# Patient Record
Sex: FEMALE | Race: WHITE | Employment: UNEMPLOYED | URBAN - NONMETROPOLITAN AREA
[De-identification: names, ages, dates, MRNs, and addresses within clinical notes are randomized per-mention and may not be internally consistent; named-entity substitution may affect disease eponyms.]

---

## 2019-10-10 ENCOUNTER — HOSPITAL ENCOUNTER (EMERGENCY)
Age: 64
Discharge: HOME | End: 2019-10-10
Attending: EMERGENCY MEDICINE
Payer: MEDICARE

## 2019-10-10 VITALS
WEIGHT: 120 LBS | BODY MASS INDEX: 20.6 KG/M2 | SYSTOLIC BLOOD PRESSURE: 150 MMHG | RESPIRATION RATE: 16 BRPM | DIASTOLIC BLOOD PRESSURE: 84 MMHG | HEART RATE: 76 BPM | OXYGEN SATURATION: 97 % | TEMPERATURE: 98.7 F

## 2019-10-10 DIAGNOSIS — L91.8 SKIN TAG: Primary | ICD-10-CM

## 2019-10-10 DIAGNOSIS — T14.8XXA BLEEDING FROM WOUND: ICD-10-CM

## 2019-10-10 PROCEDURE — 99283 EMERGENCY DEPT VISIT LOW MDM: CPT

## 2019-10-10 PROCEDURE — 6370000000 HC RX 637 (ALT 250 FOR IP): Performed by: EMERGENCY MEDICINE

## 2019-10-10 PROCEDURE — 2500000003 HC RX 250 WO HCPCS: Performed by: EMERGENCY MEDICINE

## 2019-10-10 RX ORDER — LIDOCAINE HYDROCHLORIDE AND EPINEPHRINE 10; 10 MG/ML; UG/ML
20 INJECTION, SOLUTION INFILTRATION; PERINEURAL ONCE
Status: COMPLETED | OUTPATIENT
Start: 2019-10-10 | End: 2019-10-10

## 2019-10-10 RX ADMIN — MICROFIBRILLAR COLLAGEN HEMOSTAT POWDER: POWDER at 23:37

## 2019-10-10 RX ADMIN — LIDOCAINE HYDROCHLORIDE,EPINEPHRINE BITARTRATE 20 ML: 10; .01 INJECTION, SOLUTION INFILTRATION; PERINEURAL at 23:36

## 2019-10-10 ASSESSMENT — ENCOUNTER SYMPTOMS
EYES NEGATIVE: 1
GASTROINTESTINAL NEGATIVE: 1
RESPIRATORY NEGATIVE: 1

## 2019-10-10 ASSESSMENT — PAIN SCALES - GENERAL: PAINLEVEL_OUTOF10: 2

## 2020-09-25 ENCOUNTER — HOSPITAL ENCOUNTER (OUTPATIENT)
Dept: PHYSICAL THERAPY | Age: 65
Setting detail: THERAPIES SERIES
Discharge: HOME | End: 2020-09-25
Payer: MEDICARE

## 2020-09-25 PROCEDURE — 97110 THERAPEUTIC EXERCISES: CPT

## 2020-09-25 PROCEDURE — G0283 ELEC STIM OTHER THAN WOUND: HCPCS

## 2020-09-25 PROCEDURE — 97161 PT EVAL LOW COMPLEX 20 MIN: CPT

## 2020-09-25 ASSESSMENT — PAIN DESCRIPTION - ONSET: ONSET: SUDDEN

## 2020-09-25 ASSESSMENT — PAIN DESCRIPTION - PAIN TYPE: TYPE: ACUTE PAIN

## 2020-09-25 ASSESSMENT — PAIN DESCRIPTION - FREQUENCY: FREQUENCY: INTERMITTENT

## 2020-09-25 ASSESSMENT — PAIN SCALES - GENERAL: PAINLEVEL_OUTOF10: 0

## 2020-09-25 ASSESSMENT — PAIN - FUNCTIONAL ASSESSMENT: PAIN_FUNCTIONAL_ASSESSMENT: ACTIVITIES ARE NOT PREVENTED

## 2020-09-25 ASSESSMENT — PAIN DESCRIPTION - ORIENTATION: ORIENTATION: RIGHT

## 2020-09-25 ASSESSMENT — PAIN DESCRIPTION - PROGRESSION: CLINICAL_PROGRESSION: NOT CHANGED

## 2020-09-25 ASSESSMENT — PAIN DESCRIPTION - LOCATION: LOCATION: SHOULDER

## 2020-09-25 ASSESSMENT — PAIN DESCRIPTION - DESCRIPTORS: DESCRIPTORS: ACHING;DULL

## 2020-09-25 NOTE — PROGRESS NOTES
Physical Therapy  Initial Assessment  Date: 2020  Patient Name: Arjun Nelson  MRN: [id number]  : 1955     Treatment Diagnosis: R UE pain    Restrictions  Position Activity Restriction  Other position/activity restrictions: none    Subjective   General  Family / Caregiver Present: No  Referring Practitioner: Calvin Ahumada  Diagnosis: R shoulder tendinitis  Follows Commands: Within Functional Limits  PT Visit Information  PT Insurance Information: Medicare advantage  Subjective  Subjective:  onset R upper back/ shoulder pain due to painting- avoids lying on shoulder  Pain Assessment  Pain Assessment: 0-10  Pain Level: 0(max pain 8/10)  Pain Type: Acute pain  Pain Location: Shoulder  Pain Orientation: Right  Pain Descriptors: Aching;Dull  Pain Frequency: Intermittent  Pain Onset: Sudden  Clinical Progression: Not changed  Functional Pain Assessment: Activities are not prevented    Vision/Hearing  Vision  Vision: (glasses)  Hearing  Hearing: Within functional limits    Orientation       Social/Functional History  Social/Functional History  Lives With: Alone  ADL Assistance: Independent  Homemaking Assistance: Independent  Ambulation Assistance: Independent  Transfer Assistance: Independent  Occupation: Retired  Leisure & Hobbies: bowling  IADL Comments: continues with all IADL despite pain    Objective     Observation/Palpation  Posture: Good  Palpation: R UT TTP    AROM RUE (degrees)  RUE AROM : WFL  Spine  Cervical: cervical AROM WFL - painfree in all directions    Strength RUE  Strength RUE: Exception  Comment: MMT produced R UE paresthesia  R Shoulder Flexion: At least;3+/5  R Shoulder ABduction: At least;3+/5  R Shoulder External Rotation:  At least;4/5     Additional Measures  Special Tests: R shoulder impingement poduced R UE parethesia                                             Assessment   Conditions Requiring Skilled Therapeutic Intervention  Body structures, Functions, Activity

## 2020-09-25 NOTE — PLAN OF CARE
Outpatient Physical Therapy           [place]           [x] Phone: [phone]   Fax: [phone]  [facility]           [] Phone: [phone]   Fax: [phone]     To: Referring Practitioner: Sedrick Hickman    From: Miguel Jeronimo, PT     Patient: Kaela Brasher       : 1955  Diagnosis: Diagnosis: R shoulder tendinitis   Treatment Diagnosis: Treatment Diagnosis: R UE pain /R upper back pain  Date: 2020    Physical Therapy Certification/Re-Certification Form    The following patient has been evaluated for physical therapy services and for therapy to continue, insurance requires physician review of the treatment plan initially and every 90 days. Please review the attached evaluation and/or summary of the patient's plan of care, and verify that you agree therapy should continue by signing the attached document and sending it back to our office.          ASSESSMENT  Patient primary complaints:R upper back/ R UE pain   History of condition:late 2020 onset R upper back/ shoulder pain due to painting- avoids lying on shoulder  Current functional limitations: does not limit function however R UE function can be painful  Clinical findings:Quick DASH 27/55; neck- R shoulder ROM WFL  PLOF:semi retired lives alone- indpendant with all necessary ADL/IADL  Skilled PT interventions are intended to: decrease pain and establish HEP which will enable patient  to return to PLOF  Patient agrees with established plan of care and assisted in the development of their  goals  Barriers to learning:none- no mental/cognitive barriers observed  Preferred learning style(s):   written- demonstration -practice  Preferred Language: English  Potential barriers to progress:none  The patient appears motivated to participate in PT and regain PLOF: yes  Plan of Care/Treatment to date:  [x] Therapeutic Exercise  [] Modalities:  [x] Therapeutic Activity     [] Ultrasound  [x] Electrical Stimulation  [] Gait Training      [] Cervical Traction [] Lumbar Traction  [x] Neuromuscular Re-education    [] Cold/hotpack [] Iontophoresis   [x] Instruction in HEP      [] Vasopneumatic     [x] Manual Therapy               [] Aquatic Therapy       Other:          Frequency/Duration:  # Days per week: [] 1 day # Weeks: [] 1 week [] 5 weeks     [x] 2 days   [] 2 weeks [x] 6 weeks     [] 3 days   [] 3 weeks [] 7 weeks     [] 4 days   [] 4 weeks [] 8 weeks         [] 9 weeks [] 10 weeks         [] 11 weeks [] 12 weeks    Rehab Potential/Progress: [] Excellent [x] Good [] Fair  [] Poor     Goals:         Long term goals  Time Frame for Long term goals : 6 weeks  Long term goal 1: patient's goal - painfree mobility  Long term goal 2: 22/55 QUICK DASH which indicatees less pain in R shoulde and or less  difficulty with R UE function compared to intial eval  Long term goal 3: patient will be independent and compliant with HEP in order to be prepared for discharge      Electronically signed by:  Barry Jesus PT, 9/25/2020, 6:23 PM        If you have any questions or concerns, please don't hesitate to call.   Thank you for your referral.      Physician Signature:________________________________Date:_________ TIME: _____  By signing above, therapists plan is approved by physician

## 2020-09-25 NOTE — FLOWSHEET NOTE
Outpatient Physical Therapy  [place]           [x] Phone: [phone]   Fax: [phone]  [facility]son           [] Phone: [phone]   Fax: [phone]        Physical Therapy Daily Treatment Note  Date:  2020    Patient Name:  Sabas Mcardle    :  1955  MRN: [id number]  Restrictions/Precautions:  Other position/activity restrictions: none  Diagnosis:   Diagnosis: R shoulder tendinitis  Date of Injury/Surgery:   Treatment Diagnosis: Treatment Diagnosis: R UE pain    Insurance/Certification information: PT Insurance Information: Medicare advantage   Referring Physician:  Referring Practitioner: Luiz Foss  Next Doctor Visit:    Plan of care signed (Y/N):    Outcome Measure: Quick DASH   Visit# / total visits:  1 /10 then PN  Pain level: 0/10 @ rest  Goals:          Long term goals  Time Frame for Long term goals : 6 weeks  Long term goal 1: patient's goal - painfree mobility  Long term goal 2:  QUICK DASH which indicatees less pain in R shoulde and or less  difficulty with R UE function compared to intial eval  Long term goal 3: patient will be independent and compliant with HEP in order to be prepared for discharge    Summary of Evaluation:   Patient primary complaints:R upper back/ R UE pain   History of condition:late 2020 onset R upper back/ shoulder pain due to painting- avoids lying on shoulder  Current functional limitations: does not limit function however R UE function can be painful  Clinical findings:Quick DASH ; neck- R shoulder ROM WFL  PLOF:semi retired lives alone- indpendant with all necessary ADL/IADL  Skilled PT interventions are intended to: decrease pain and establish HEP which will enable patient  to return to PLOF  Patient agrees with established plan of care and assisted in the development of their  goals  Barriers to learning:none- no mental/cognitive barriers observed  Preferred learning style(s):   written- demonstration -practice  Preferred Language: English  Potential barriers to progress:none  The patient appears motivated to participate in PT and regain PLOF: yes    Subjective:  See eval         Any changes in Ambulatory Summary Sheet? None        Objective:  See eval   Prior to today's treatment session, patient was screened for signs and symptoms related to COVID-19 including but not limited to verbally answering questions related to feeling ill, cough, or SOB, along with taking temperature via forehead thermometer. Patient presented with all negative signs and symptoms and had no fever >100 degrees Fahrenheit this date. Exercises: (No more than 4 columns)   Exercise/Equipment Date  9/25/20 Date Date           WARM UP                     TABLE                                       STANDING      scap retraction RTB @ wall                                              PROPRIOCEPTION                                    MODALITIES See below                     Other Therapeutic Activities/Education:        Home Exercise Program:        Manual Treatments:  STM R UT      Modalities:  Patient [id number] minutes of inferential current electrical stimulation to the uper back  area to decrease pain and muscle tension. Patient received this treatment in the seated  position. The intensity of the current was raises to the patients comfort for pain relief. Patient demonstrated negative skin reaction after treatment. Communication with other providers:        Assessment:  (Response towards treatment session) (Pain Rating)    Pt tolerated tx well without any adverse reactions or complications this date.   . Pt would continue to benefit from skilled therapy interventions to address remaining impairments, improve mobility and strength, finalize HEP,  and progress toward goal completion and prepare for d/c ; end session pain rating 0/10 @ rest- up to 7/10 brief intense pain with movement of R ARM/upper body      Plan for Next Session:        Time In / Time Out:     See eval        Timed Code/Total Treatment Minutes:  13' TE x1/50' includes eval and e-stim    Next Progress Note due:        Plan of Care Interventions:  [x] Therapeutic Exercise  [] Modalities:  [x] Therapeutic Activity     [] Ultrasound  [x] Estim  [] Gait Training      [] Cervical Traction [] Lumbar Traction  [x] Neuromuscular Re-education    [] Cold/hotpack [] Iontophoresis   [x] Instruction in HEP      [] Vasopneumatic   [] Dry Needling    [x] Manual Therapy               [] Aquatic Therapy              Electronically signed by:  Elo Preciado 9/25/2020, 6:28 PM

## 2020-09-30 NOTE — FLOWSHEET NOTE
Patients Plan of Care was received and signed. Signed POC was scanned and placed in the patients chart.     Adelina Blackmon

## 2020-10-05 ENCOUNTER — HOSPITAL ENCOUNTER (OUTPATIENT)
Dept: PHYSICAL THERAPY | Age: 65
Setting detail: THERAPIES SERIES
Discharge: HOME | End: 2020-10-05
Payer: MEDICARE

## 2020-10-05 PROCEDURE — 97140 MANUAL THERAPY 1/> REGIONS: CPT

## 2020-10-05 PROCEDURE — G0283 ELEC STIM OTHER THAN WOUND: HCPCS

## 2020-10-05 PROCEDURE — 97110 THERAPEUTIC EXERCISES: CPT

## 2020-10-05 NOTE — FLOWSHEET NOTE
Outpatient Physical Therapy  [place]           [x] Phone: [phone]   Fax: [phone]  [facility] Michael           [] Phone: [phone]   Fax: [phone]        Physical Therapy Daily Treatment Note  Date:  10/5/2020    Patient Name:  Bernie Nair    :  1955  MRN: [id number]  Restrictions/Precautions:  Other position/activity restrictions: none  Diagnosis:   Diagnosis: R shoulder tendinitis  Date of Injury/Surgery:   Treatment Diagnosis: Treatment Diagnosis: R UE pain    Insurance/Certification information: PT Insurance Information: Medicare advantage   Referring Physician:  Referring Practitioner: Josiah Acharya  Next Doctor Visit:    Plan of care signed (Y/N):    Outcome Measure: Quick DASH   Visit# / total visits:  2 10 then PN  Pain level: 0/10 @ rest  Goals:          Long term goals  Time Frame for Long term goals : 6 weeks  Long term goal 1: patient's goal - painfree mobility  Long term goal 2:  QUICK DASH which indicatees less pain in R shoulde and or less  difficulty with R UE function compared to intial eval  Long term goal 3: patient will be independent and compliant with HEP in order to be prepared for discharge    Summary of Evaluation:   Patient primary complaints:R upper back/ R UE pain   History of condition:late 2020 onset R upper back/ shoulder pain due to painting- avoids lying on shoulder  Current functional limitations: does not limit function however R UE function can be painful  Clinical findings:Quick DASH ; neck- R shoulder ROM WFL  PLOF:semi retired lives alone- indpendant with all necessary ADL/IADL  Skilled PT interventions are intended to: decrease pain and establish HEP which will enable patient  to return to PLOF  Patient agrees with established plan of care and assisted in the development of their  goals  Barriers to learning:none- no mental/cognitive barriers observed  Preferred learning style(s):   written- demonstration -practice  Preferred Language: English  Potential barriers to progress:none  The patient appears motivated to participate in PT and regain PLOF: yes    Subjective: Patient reports not being able to see chiropractor last week;  RUE symptoms have not been as intense lately        Any changes in Ambulatory Summary Sheet? None        Objective:  Able to perform scap retraction exercise with minimal increase in R UE pain/paresthisia  Prior to today's treatment session, patient was screened for signs and symptoms related to COVID-19 including but not limited to verbally answering questions related to feeling ill, cough, or SOB, along with taking temperature via forehead thermometer. Patient presented with all negative signs and symptoms and had no fever >100 degrees Fahrenheit this date. Exercises: (No more than 4 columns)   Exercise/Equipment Date  9/25/20 Date 10/5/20 Date           WARM UP         UBE  BWD x 4\"          TABLE                  Manual therapy  Seated and prone focus on R UT/ mid trap area                   STANDING      scap retraction RTB @ wall RTB @ wall arms @ side, mid rows, lat pulls and LAE 10 reps ea                                             PROPRIOCEPTION                                    MODALITIES See below See below                    Other Therapeutic Activities/Education:        Home Exercise Program:        Manual Treatments:  STM R UT      Modalities:  Patient [id number] minutes of inferential current electrical stimulation to the uper back  area to decrease pain and muscle tension. Patient received this treatment in the seated  position. The intensity of the current was raises to the patients comfort for pain relief. Patient demonstrated negative skin reaction after treatment. Communication with other providers:        Assessment:  (Response towards treatment session) (Pain Rating)    Pt tolerated tx well without any adverse reactions or complications this date.   . Pt would continue to benefit from skilled therapy interventions to address remaining impairments, improve mobility and strength, finalize HEP,  and progress toward goal completion and prepare for d/c ; end session pain rating 0/10 @ rest- up to 5/10 brief intense pain with movement of R ARM/upper body      Plan for Next Session:        Time In / Time Out:    0275/1157        Timed Code/Total Treatment Minutes:  10' STM x1, 15' TE x1/ 42' includes e- stim    Next Progress Note due:        Plan of Care Interventions:  [x] Therapeutic Exercise  [] Modalities:  [x] Therapeutic Activity     [] Ultrasound  [x] Estim  [] Gait Training      [] Cervical Traction [] Lumbar Traction  [x] Neuromuscular Re-education    [] Cold/hotpack [] Iontophoresis   [x] Instruction in HEP      [] Vasopneumatic   [] Dry Needling    [x] Manual Therapy               [] Aquatic Therapy              Electronically signed by:  Ashwini Redmond 10/5/2020, 1:33 PM

## 2020-10-08 ENCOUNTER — HOSPITAL ENCOUNTER (OUTPATIENT)
Dept: PHYSICAL THERAPY | Age: 65
Setting detail: THERAPIES SERIES
Discharge: HOME | End: 2020-10-08
Payer: MEDICARE

## 2020-10-08 NOTE — FLOWSHEET NOTE
Physical Therapy  Cancellation/No-show Note  Patient Name:  Kristel Harris  :  1955   Date:  10/8/2020  Cancelled visits to date: 1  No-shows to date: 0    For today's appointment patient:  [x]  Cancelled  []  Rescheduled appointment  []  No-show     Reason given by patient:  []  Patient ill  []  Conflicting appointment  []  No transportation    []  Conflict with work  []  No reason given  [x]  Other:     Comments:  Something came up with her Dad    Electronically signed by:  Shun Haro, KAVITHA

## 2020-11-01 NOTE — DISCHARGE SUMMARY
Outpatient Physical Therapy           [place]           [] Phone: [phone]   Fax: [phone]  [facility]           [x] Phone: [phone]   Fax: [phone]       Referring Practitioner: Carol Chris                                         From: Guevara Beckett, PT             Patient: May Gudino                                                             : 1955  Diagnosis: Diagnosis: R shoulder tendinitis    Treatment Diagnosis: Treatment Diagnosis: R UE pain /R upper back pain  []  Progress Note                [x]  Discharge Note    Evaluation Date:  20   Total Visits to date:   2 planned for  up to 10 Cancels/No-shows to date:      Subjective:  10/5/20- final PT session: Patient reports not being able to see chiropractor last week;  RUE symptoms have not been as intense lately      Plan of Care/Treatment to date:  [x] Therapeutic Exercise    [] Modalities:  [x] Therapeutic Activity     [] Ultrasound  [] Electrical Stimulation  [] Gait Training      [] Cervical Traction   [] Lumbar Traction  [x] Neuromuscular Re-education  [] Cold/hotpack [] Iontophoresis  [x] Instruction in HEP      Other:  [x] Manual Therapy       []  Vasopneumatic  [] Aquatic Therapy       []                          Objective/Significant Findings At Last Visit/Comments:Able to perform scap retraction exercise with minimal increase in R UE pain/paresthisia      Goal Status:  [] Achieved [] Partially Achieved  [x] Not Assessed   Long term goal 2:  QUICK DASH which indicatees less pain in R shoulde and or less  difficulty with R UE function compared to intial eval  Long term goal 3: patient will be independent and compliant with HEP in order to be prepared for discharge     [x] Patient now discharged- has not scheduled further OP PT      Electronically signed by:  Guevara Beckett PT, 2020, 9:44 AM    If you have any questions or concerns, please don't hesitate to call.   Thank you for your referral.

## 2021-03-30 ENCOUNTER — HOSPITAL ENCOUNTER (OUTPATIENT)
Dept: PHYSICAL THERAPY | Age: 66
Setting detail: THERAPIES SERIES
Discharge: HOME | End: 2021-03-30
Payer: MEDICARE

## 2021-03-30 PROCEDURE — 97110 THERAPEUTIC EXERCISES: CPT

## 2021-03-30 PROCEDURE — 97161 PT EVAL LOW COMPLEX 20 MIN: CPT

## 2021-03-30 ASSESSMENT — PAIN SCALES - GENERAL: PAINLEVEL_OUTOF10: 0

## 2021-03-30 ASSESSMENT — PAIN DESCRIPTION - ORIENTATION: ORIENTATION: LEFT

## 2021-03-30 ASSESSMENT — PAIN DESCRIPTION - FREQUENCY: FREQUENCY: INTERMITTENT

## 2021-03-30 NOTE — PROGRESS NOTES
(degrees)  RUE AROM : WNL  PROM LUE (degrees)  LUE PROM: WNL  AROM LUE (degrees)  LUE AROM : WNL  Spine  Cervical: WNL cervical side glides and downglides bilat  Special Tests: Spurlings (-), ULTTA & ULTT B (-), cervical distraction (-), Neers (-), Darcie Salon (-), Castleton's (-), Sustained Flexion for Cubital Tunnel (+), sustained pressure over cubital tunnel (+) for increased forearm/4/5th MC,  Joint Mobility  ROM RUE: Ant and Inf GH capsule hypermobility compared to cotnralateral; reported increased symptoms with GH joint distraction and A to P mobilization, no pain on cotnralateral side    Strength RUE  Strength RUE: WFL  Comment: *increased forearm N/T with resisted wrist ext, no change with resisted wrist flex; 4/5 LT, rhomboids, MT  R Shoulder Flexion: 4+/5(*noted increase symptoms)  R Shoulder Extension: 5/5  R Shoulder Internal Rotation: 5/5  R Shoulder External Rotation: 4+/5  R Elbow Flexion: 4+/5  R Wrist Flexion: 5/5  R Wrist Extension: 4+/5(*increased symptoms in forearm)  Strength LUE  Strength LUE: WFL  Strength Other  Other: Noted significant decrease in  strength on RUE; increased N/T symptoms in forearm/4th/5th finger with sustained squeeze     Assessment   Conditions Requiring Skilled Therapeutic Intervention  Body structures, Functions, Activity limitations: Decreased functional mobility ; Decreased ADL status; Decreased sensation; Increased pain;Decreased strength  Pt is 76year old female with 8 month gradual onset of R shoulder/elbow/hand pain w/ numbness and tingling noted; onset after painting her porch at the end of last summer. Pt now has difficulties completing ADL's involving gripping, golf, pilates, sleeping on her R shoulder.  Pt demo deficits this date that include reduced scapular/RC strength, painful resisted wrist extension, reduced  strength due to increase in symptoms, increase in symptoms below elbow and into 4th/5th fingers w/ full elbow extension, and hypermobility of goal 3: Pt demo ability to lift a case of water bottle w/o increase in symptoms and no weakness noted for a distance of 10 ft  Long term goal 4: Pt demo 5/5 mid trap/rhomboids/low trap strength w/o increase in symptoms and to improve scapular strength  Long term goal 5: Pt will play golf and notes symptom severity does not increase during or after  Patient Goals   Patient goals : get back to golf, pilates, and lifting heavier objects w/o fear of dropping       Buster Bolk, PT, DPT, OCS    3/30/2021 11:55 AM   Mimi Colon, SPT

## 2021-03-30 NOTE — PLAN OF CARE
Outpatient Physical Therapy           [place]           [] Phone: [phone]   Fax: [phone]  [facility] olson           [] Phone: [phone]   Fax: [phone]     To: Referring Practitioner: Dr. Zi Shelley, Dr. Wanda Luna ([facility])  From: Cleopatra Bills, PT     Patient: Rell Wright       : 1955  Diagnosis: Diagnosis: Rt Shoulder Pain   Treatment Diagnosis: Treatment Diagnosis: Decreased /RC/scapular strength, limited due to N/T in R forearm/hand   Date: 3/30/2021    Physical Therapy Certification/Re-Certification Form  Dear Dr. Gabriele Samuels,   The following patient has been evaluated for physical therapy services and for therapy to continue, insurance requires physician review of the treatment plan initially and every 90 days. Please review the attached evaluation and/or summary of the patient's plan of care, and verify that you agree therapy should continue by signing the attached document and sending it back to our office. Assessment:      Pt is 76year old female with 8 month gradual onset of R shoulder/elbow/hand pain w/ numbness and tingling noted; onset after painting her porch at the end of last summer. Pt now has difficulties completing ADL's involving gripping, golf, pilates, sleeping on her R shoulder. Pt demo deficits this date that include reduced scapular/RC strength, painful resisted wrist extension, reduced  strength due to increase in symptoms, increase in symptoms below elbow and into 4th/5th fingers w/ full elbow extension, and hypermobility of [address] joint in ant/inf directions. Testing this date indicate signs and symptoms of possible RC tendonitis w/ cubital tunnel syndrome due to previous excessive use during painting, golfing, and other household activities. Pt will benefit with PT services with wrist ext/flex tissue massage, scapular/RC strengthening, ulnar nerve glides,  strengthening to tolerance, and lat dorsi stretching to return to PLOF.  Pt prior to onset of current condition had no pain with able to complete full ADLs and work activities. Patient received education on their current pathology and how their condition effects them with their functional activities. Patient understood discussion and questions were answered. Patient understands their activity limitations and understands rational for treatment progression. Plan of Care/Treatment to date:  [x] Therapeutic Exercise  [] Modalities:  [x] Therapeutic Activity     [x] Ultrasound  [] Electrical Stimulation  [] Gait Training      [] Cervical Traction [] Lumbar Traction  [x] Neuromuscular Re-education    [x] Cold/hotpack [] Iontophoresis   [x] Instruction in HEP      [x] Vasopneumatic    [] Dry Needling  [x] Manual Therapy               [] Aquatic Therapy       Other:          Frequency/Duration:  # Days per week: [x] 1 day # Weeks: [] 1 week [] 5 weeks     [] 2 days   [] 2 weeks [] 6 weeks     [] 3 days   [] 3 weeks [] 7 weeks     [] 4 days   [x] 4 weeks [] 8 weeks         [] 9 weeks [] 10 weeks         [] 11 weeks [] 12 weeks    Rehab Potential/Progress: [] Excellent [x] Good [] Fair  [] Poor     Goals:    Long term goals  Time Frame for Long term goals : 4 weeks  Long term goal 1: Pt demo I w/ HEP and pain management  Long term goal 2: Pt demo wrist flex/ext strength 5/5 w/o increase in symptoms  Long term goal 3: Pt demo ability to lift a case of water bottle w/o increase in symptoms and no weakness noted for a distance of 10 ft  Long term goal 4: Pt demo 5/5 mid trap/rhomboids/low trap strength w/o increase in symptoms and to improve scapular strength  Long term goal 5: Pt will play golf and notes symptom severity does not increase during or after      Electronically signed by:  Lilian Galvan, PT, DPT, OCS 3/30/2021, 12:32 PM    3/30/2021 12:32 PM   Loyda Gonzalez, SPT      If you have any questions or concerns, please don't hesitate to call.   Thank you for your referral.      Physician

## 2021-03-30 NOTE — FLOWSHEET NOTE
extension, and hypermobility of [address] joint in ant/inf directions. Testing this date indicate signs and symptoms of possible RC tendonitis w/ cubital tunnel syndrome due to previous excessive use during painting, golfing, and other household activities. Pt will benefit with PT services with wrist ext/flex tissue massage, scapular/RC strengthening, ulnar nerve glides,  strengthening to tolerance, and lat dorsi stretching to return to PLOF. Pt prior to onset of current condition had no pain with able to complete full ADLs and work activities. Patient received education on their current pathology and how their condition effects them with their functional activities. Patient understood discussion and questions were answered. Patient understands their activity limitations and understands rational for treatment progression. Subjective:  See eval         Any changes in Ambulatory Summary Sheet? None        Objective:  See eval   Prior to today's treatment session, patient was screened for signs and symptoms related to COVID-19 including but not limited to verbally answering questions related to feeling ill, cough, or SOB, along with taking temperature via forehead thermometer. Patient presented with all negative signs and symptoms and had no fever >100 degrees Fahrenheit this date.          Exercises: (No more than 4 columns)   Exercise/Equipment 3/30/31 #1 Date Date           WARM UP         UBE  Below 90 deg          TABLE      Towel Wrist flex/ext At 90 deg elbow flex demo      Ulnar Nerve Glides      SL ER                     STANDING      Mid Rows BTB demo     Theraband ER BTB demo     Wrist Ext PROM      Lat Dorsi Wall Stretch demo     TB Elbow Flex w/ neutral wrist demo     DB Sup/Pro  Bent elbow     DB Wrist flexion/Ext           KB Carry                              PROPRIOCEPTION                                    MODALITIES      Ice Massage                 Other Therapeutic Activities/Education:  Patient received education on their current pathology and how their condition effects them with their functional activities. Patient understood discussion and questions were answered. Patient understands their activity limitations and understands rational for treatment progression. Home Exercise Program:  HO issued, reviewed and discussed with patient. Pt agreed to comply. Manual Treatments: **Next session: wrist flexor massage      Modalities:  -      Communication with other providers:  Ednaal sent 3/30/21      Assessment:  (Response towards treatment session) (Pain Rating)  Pt is 76year old female with 8 month gradual onset of R shoulder/elbow/hand pain w/ numbness and tingling noted; onset after painting her porch at the end of last summer. Pt now has difficulties completing ADL's involving gripping, golf, pilates, sleeping on her R shoulder. Pt demo deficits this date that include reduced scapular/RC strength, painful resisted wrist extension, reduced  strength due to increase in symptoms, increase in symptoms below elbow and into 4th/5th fingers w/ full elbow extension, and hypermobility of [facility] joint in ant/inf directions. Testing this date indicate signs and symptoms of possible RC tendonitis w/ cubital tunnel syndrome due to previous excessive use during painting, golfing, and other household activities. Pt will benefit with PT services with wrist ext/flex tissue massage, scapular/RC strengthening, ulnar nerve glides,  strengthening to tolerance, and lat dorsi stretching to return to PLOF. Pt prior to onset of current condition had no pain with able to complete full ADLs and work activities. Patient received education on their current pathology and how their condition effects them with their functional activities. Patient understood discussion and questions were answered. Patient understands their activity limitations and understands rational for treatment progression.          Plan for Next Session: Specific instructions for Next Treatment: wrist flexor/extensor tissue massage, continue progression of scapular/RC strengthening, Wrist ext/flex stretching, ulnar nerve floss, ice massage      Time In / Time Out:  2365-3050         If [facility] Please Indicate Time In/Out/Total Time  CPT Code Time in Time out Total Time                                                            Total for session             Timed Code/Total Treatment Minutes:  25'/60'     (1) PT Eval 35', (2) TE 25'      Next Progress Note due:  Clairssa sent 3/30/21      Plan of Care Interventions:  [x] Therapeutic Exercise  [x] Modalities:  [x] Therapeutic Activity     [] Ultrasound  [] Estim  [] Gait Training      [] Cervical Traction [] Lumbar Traction  [x] Neuromuscular Re-education    [x] Cold/hotpack [] Iontophoresis   [x] Instruction in HEP      [x] Vasopneumatic   [] Dry Needling    [x] Manual Therapy               [] Aquatic Therapy              Electronically signed by:  Fabrizio Martell, PT, DPT, OCS 3/30/2021, 12:33 PM    3/30/2021 12:33 PM   Rafy Narayanan, SPT

## 2021-04-06 ENCOUNTER — HOSPITAL ENCOUNTER (OUTPATIENT)
Dept: PHYSICAL THERAPY | Age: 66
Setting detail: THERAPIES SERIES
Discharge: HOME | End: 2021-04-06
Payer: MEDICARE

## 2021-04-06 PROCEDURE — 97110 THERAPEUTIC EXERCISES: CPT

## 2021-04-06 PROCEDURE — 97112 NEUROMUSCULAR REEDUCATION: CPT

## 2021-04-06 NOTE — FLOWSHEET NOTE
extension, and hypermobility of [address] joint in ant/inf directions. Testing this date indicate signs and symptoms of possible RC tendonitis w/ cubital tunnel syndrome due to previous excessive use during painting, golfing, and other household activities. Pt will benefit with PT services with wrist ext/flex tissue massage, scapular/RC strengthening, ulnar nerve glides,  strengthening to tolerance, and lat dorsi stretching to return to PLOF. Pt prior to onset of current condition had no pain with able to complete full ADLs and work activities. Patient received education on their current pathology and how their condition effects them with their functional activities. Patient understood discussion and questions were answered. Patient understands their activity limitations and understands rational for treatment progression. Subjective:   Getting better,  No pain at rest today. Mowed grass on zero turn mower and felt like arms were vibrating afterwards. Any changes in Ambulatory Summary Sheet? None        Objective:  See eval   Prior to today's treatment session, patient was screened for signs and symptoms related to COVID-19 including but not limited to verbally answering questions related to feeling ill, cough, or SOB, along with taking temperature via forehead thermometer. Patient presented with all negative signs and symptoms and had no fever >100 degrees Fahrenheit this date. Demo good technique w/ ulnar nerve glides w/ centralization of symptoms afterwards  Tolerated SL Flex better than ABD; slight increase in peripherialization of symptoms      Exercises: (No more than 4 columns)   Exercise/Equipment 3/30/31 #1 4/6/21 #2 Date 4/           WARM UP         UBE  Below 90 deg 2 min each way          TABLE      Towel Wrist flex/ext At 90 deg elbow flex demo      Ulnar Nerve Glides  \"finger glasses\" and stretch on wall.     x10    SL ER  10x2     SL ABD  10x2    SL Flex      10x2 STANDING      Mid Rows BTB demo     Theraband ER BTB demo     Wrist Extensor Stretch  10\" x2     Lat Dorsi Wall Stretch demo     TB Elbow Flex w/ neutral wrist demo     DB Sup/Pro  Bent elbow 3# dumbbell 2x10    DB Wrist flexion/Ext       #1 flexion 2 x10. Extension caused cramping and pain. DB Carry  #10 x3 laps *no diffiuclties                            PROPRIOCEPTION                                    MODALITIES      Ice Massage                 Other Therapeutic Activities/Education:  Patient received education on their current pathology and how their condition effects them with their functional activities. Patient understood discussion and questions were answered. Patient understands their activity limitations and understands rational for treatment progression. Home Exercise Program:  HO issued, reviewed and discussed with patient. Pt agreed to comply. Manual Treatments:  -      Modalities:  -      Communication with other providers:  Clarissa sent 3/30/21      Assessment:  Josey Holland good tolerance to today's session and improvements in neural symptoms after completion of ulnar nerve glides. Able tolerate progression of RC strengthening in gravity eliminated positions; peripheralization into 4th finger w/ SL ABD. Demos good ability to  10 lbs weight for extended period of time against gravity w/o dropping or increased symptoms. Overall, pt is showing good progression and appropriate muscle fatigue. Symptoms in 4th finger at the end of the session, decreased intensity     Pt is 76year old female with 8 month gradual onset of R shoulder/elbow/hand pain w/ numbness and tingling noted; onset after painting her porch at the end of last summer. Pt now has difficulties completing ADL's involving gripping, golf, pilates, sleeping on her R shoulder.  Pt demo deficits this date that include reduced scapular/RC strength, painful resisted wrist extension, reduced  strength due to increase in symptoms, increase in symptoms below elbow and into 4th/5th fingers w/ full elbow extension, and hypermobility of [place] joint in ant/inf directions. Testing this date indicate signs and symptoms of possible RC tendonitis w/ cubital tunnel syndrome due to previous excessive use during painting, golfing, and other household activities. Pt will benefit with PT services with wrist ext/flex tissue massage, scapular/RC strengthening, ulnar nerve glides,  strengthening to tolerance, and lat dorsi stretching to return to PLOF. Pt prior to onset of current condition had no pain with able to complete full ADLs and work activities. Patient received education on their current pathology and how their condition effects them with their functional activities. Patient understood discussion and questions were answered. Patient understands their activity limitations and understands rational for treatment progression. Plan for Next Session: Specific instructions for Next Treatment: wrist flexor/extensor tissue massage, continue progression of scapular/RC strengthening, Wrist ext/flex stretching, ulnar nerve floss, ice massage      Time In / Time Out:  3942/1999 DB (20) [id number] NP (22)       If [facility] Please Indicate Time In/Out/Total Time  CPT Code Time in Time out Total Time                                                            Total for session             Timed Code/Total Treatment Minutes:  42'/42'    (2) TE  32'    (1) Neuro 10'       Next Progress Note due:  Clarissa sent 3/30/21      Plan of Care Interventions:  [x] Therapeutic Exercise  [x] Modalities:  [x] Therapeutic Activity     [] Ultrasound  [] Estim  [] Gait Training      [] Cervical Traction [] Lumbar Traction  [x] Neuromuscular Re-education    [x] Cold/hotpack [] Iontophoresis   [x] Instruction in HEP      [x] Vasopneumatic   [] Dry Needling    [x] Manual Therapy               [] Aquatic Therapy              Electronically signed by:  Lily Powers. Serene PT, 4/6/2021, 9:52 AM  Genaro Chan PT, DPT, OCS    4/6/2021 10:27 AM     4/6/2021 9:52 AM   Christiano Weeks, SPT

## 2021-04-13 ENCOUNTER — HOSPITAL ENCOUNTER (OUTPATIENT)
Dept: PHYSICAL THERAPY | Age: 66
Setting detail: THERAPIES SERIES
Discharge: HOME | End: 2021-04-13
Payer: MEDICARE

## 2021-04-13 PROCEDURE — 97112 NEUROMUSCULAR REEDUCATION: CPT

## 2021-04-13 PROCEDURE — 97110 THERAPEUTIC EXERCISES: CPT

## 2021-04-13 NOTE — FLOWSHEET NOTE
Outpatient Physical Therapy  [place]           [x] Phone: [phone]   Fax: [phone]  Leila olson           [] Phone: [phone]   Fax: [phone]        Physical Therapy Daily Treatment Note  Date:  2021    Patient Name:  Cheyenne Israel    :  1955  MRN: [id number]  Restrictions/Precautions:  None  Diagnosis:   Diagnosis: Rt Shoulder Pain  Date of Injury/Surgery: 2020, returned 2021  Treatment Diagnosis: Decreased /RC/scapular strength, limited due to N/T in R forearm/hand   Insurance/Certification information:  [facility] Medicare    Referring Physician:  Referring Practitioner: Dr. Sean Low, Dr. Shun Tan ([facility])  Next Doctor Visit:  -  Plan of care signed (Y/N):  N, sent 3/30/21  Outcome Measure: Quick DASH: 43%  Visit# / total visits:  3/4  Pain level: 0/10 upon arrival, min numbness to touch ant forearm    Goals:     Long term goals  Time Frame for Long term goals : 4 weeks  Long term goal 1: Pt demo I w/ HEP and pain management  Long term goal 2: Pt demo wrist flex/ext strength 5/5 w/o increase in symptoms  Long term goal 3: Pt demo ability to lift a case of water bottle w/o increase in symptoms and no weakness noted for a distance of 10 ft  Long term goal 4: Pt demo 5/5 mid trap/rhomboids/low trap strength w/o increase in symptoms and to improve scapular strength  Long term goal 5: Pt will play golf and notes symptom severity does not increase during or after    Summary of Evaluation:  Pt is 76year old female with 8 month gradual onset of R shoulder/elbow/hand pain w/ numbness and tingling noted; onset after painting her porch at the end of last summer. Pt now has difficulties completing ADL's involving gripping, golf, pilates, sleeping on her R shoulder.  Pt demo deficits this date that include reduced scapular/RC strength, painful resisted wrist extension, reduced  strength due to increase in symptoms, increase in symptoms below elbow and into 4th/5th fingers w/ TABLE      Towel Wrist flex/ext At 90 deg elbow flex demo   Flex bar 3\" into flex   Ulnar Nerve Glides  \"finger glasses\" and stretch on wall. x10 \"finger glasses\" 10x1 *VC for wrist position    SL ER  10x2     SL ABD  10x2    SL Flex      10x2                             STANDING      Mid Rows BTB demo     Theraband ER BTB demo  GTB 10x2   Wrist Extensor Stretch  10\" x2  10\" x3 L only    Lat Dorsi Wall Stretch demo  15\" x3 L only    TB Elbow Flex w/ neutral wrist demo     DB Sup/Pro  Bent elbow 3# dumbbell 2x10 Bent elbow 3# dumbbell 2x10   DB Wrist flexion/Ext       #1 flexion 2 x10. Extension caused cramping and pain. #1 2x10 both dir   DB Carry  #10 x3 laps *no diffiuclties Crate w/ weights   TB Shoulder Ext   10x2    Standing Scaption    10x2 #1               PROPRIOCEPTION                                    MODALITIES      Ice Massage    declined             Other Therapeutic Activities/Education:  Patient received education on their current pathology and how their condition effects them with their functional activities. Patient understood discussion and questions were answered. Patient understands their activity limitations and understands rational for treatment progression. Home Exercise Program:  HO issued, reviewed and discussed with patient. Pt agreed to comply. Manual Treatments:  -      Modalities:  -      Communication with other providers:  Clarissa sent 3/30/21      Assessment:  Rita Hennessy good tolerance to today's session and significant improvements in neural symptoms. Required some verbal cues for proper ulnar nerve glide sequencing and technique. Progression to standing RC/scapualr strengthening w/ good tolerance and no increase in neural symptoms. Overall, pt shows significant improvements in neural symptoms/stays more localized to ant forearm/wrist and RC/scapular strengthening. Discussed following up in a couple of weeks to update HEP and complete progress note.      No Note due:  Eval sent 3/30/21      Plan of Care Interventions:  [x] Therapeutic Exercise  [x] Modalities:  [x] Therapeutic Activity     [] Ultrasound  [] Estim  [] Gait Training      [] Cervical Traction [] Lumbar Traction  [x] Neuromuscular Re-education    [x] Cold/hotpack [] Iontophoresis   [x] Instruction in HEP      [x] Vasopneumatic   [] Dry Needling    [x] Manual Therapy               [] Aquatic Therapy              Electronically signed by:  Dominik Campos, PT, 4/13/2021, 7:29 AM  Jessica Sinclair, PT, DPT, OCS    4/13/2021 7:29 AM     4/13/2021 7:29 AM   Jamie Alfaro, SPT

## 2022-04-07 NOTE — FLOWSHEET NOTE
Patients  called today stating that his wife tested positive for Covid on Tuesday 3/5. She has since had a seizure on Wednesday & Thursday. Right now she does not have a fever at the moment- they have been giving her Acetaminophen & her temp is currently at 98.2.  Patient's  states they have been in touch with her Neurologist as well. He is wondering what they need to look out for right now, & if there is any medication she should be taking. Please advise.   Patients Plan of Care was received and signed. Signed POC was scanned and placed in the patients chart.     Everrett Babinski

## 2022-10-24 ENCOUNTER — HOSPITAL ENCOUNTER (OUTPATIENT)
Dept: PHYSICAL THERAPY | Age: 67
Setting detail: THERAPIES SERIES
Discharge: HOME | End: 2022-10-24
Payer: MEDICARE

## 2022-10-24 PROCEDURE — 97161 PT EVAL LOW COMPLEX 20 MIN: CPT

## 2022-10-24 PROCEDURE — 97110 THERAPEUTIC EXERCISES: CPT

## 2022-10-24 NOTE — PLAN OF CARE
Outpatient Physical Therapy           [place]           [] Phone: [phone]   Fax: [phone]  [facility]           [x] Phone: [phone]   Fax: [phone]     To: MD Yulisa Sesay   From: Jesus Monterroso PT,   Patient: Jonel Avelar       : 1955  Diagnosis: Arm injury [S49.90XA] Diagnosis: R shoulder/arm pain  Treatment Diagnosis: R UE pain  Date: 10/24/2022    Physical Therapy Certification/Re-Certification Form    The following patient has been evaluated for physical therapy services and for therapy to continue, insurance requires physician review of the treatment plan initially and every 90 days. Please review the attached evaluation and/or summary of the patient's plan of care, and verify that you agree therapy should continue by signing the attached document and sending it back to our office.     Assessment:     R shoulder stiffness    Plan of Care/Treatment to date:  [x] Therapeutic Exercise  [] Modalities:  [x] Therapeutic Activity     [] Ultrasound  [] Electrical Stimulation  [] Gait Training      [] Cervical Traction [] Lumbar Traction  [x] Neuromuscular Re-education    [] Cold/hotpack [] Iontophoresis   [x] Instruction in HEP      [x] Vasopneumatic    [] Dry Needling  [x] Manual Therapy               [] Aquatic Therapy           Frequency/Duration:  # Days per week: [x] 1 day # Weeks: [] 1 week [] 5 weeks     [] 2 days   [] 2 weeks [x] 6 weeks     [] 3 days   [] 3 weeks [] 7 weeks     [] 4 days   [] 4 weeks [] 8 weeks         [] 9 weeks [] 10 weeks         [] 11 weeks [] 12 weeks    Rehab Potential/Progress: [] Excellent [x] Good [] Fair  [] Poor     Goals:    Patient goals: regain R UE function  Long Term Goals  Time Frame for Long Term Goals: 6 weeks, plan expires 22  patient will be independent with HEP  patient will be able to touch T8 with R thumb  Electronically signed by:  Jesus Monterroso PT, , 10/24/2022, 1:58 PM  If you have any questions or concerns, please don't hesitate to call.   Thank you for your referral.      Physician Signature:________________________________Date:_________ TIME: _____  By signing above, therapists plan is approved by physician

## 2022-10-24 NOTE — FLOWSHEET NOTE
Outpatient Physical Therapy  [place]           [] Phone: [phone]   Fax: [phone]  [facility]           [x] Phone: [phone]   Fax: [phone]        Physical Therapy Daily Treatment Note  Date:  10/24/2022    Patient Name:  My Chamberlain    :  1955  MRN: [id number]  Restrictions/Precautions: No data recorded   Position Activity Restriction  Other position/activity restrictions: none  Diagnosis:   Arm injury [S49.90XA] Diagnosis: R shoulder/arm pain  Date of Injury/Surgery:   Treatment Diagnosis:  R UE pain  Insurance/Certification information: [facility]- [facility]OMN/no pre cert  Referring Physician:  MD Ivonne Garces   PCP: Carlos Reynolds MD  Next Doctor Visit:    Plan of care signed (Y/N):    Outcome Measure:   Visit# / total visits:   / then PN  Pain level: 0/10   Goals:     Patient goals: regain R UE function  Long Term Goals  Time Frame for Long Term Goals: 6 weeks, plan expires 22  patient will be independent with HEP  patient will be able to touch T8 with R thumb               Summary of Evaluation:           Subjective:  See eval         Any changes in Ambulatory Summary Sheet?   None        Objective:  See eval   COVID screening questions were asked and patient attested that there had been no contact or symptoms        Exercises: (No more than 4 columns)   Exercise/Equipment Date 10/24/22 Date Date           WARM UP         UBE  2'  FWD/BWD     pulleys X10 reps     TABLE      Supine FLEX AAROM X10 reps                                STANDING      Ball pass behind back  X10 reps     Hands behind back slide up back X 5 reps                                        PROPRIOCEPTION                                    MODALITIES                      Other Therapeutic Activities/Education:        Home Exercise Program:        Manual Treatments:        Modalities:        Communication with other providers:        Assessment:  (Response towards treatment session) (Pain Rating) Pt tolerated  treatment without any adverse reactions or complications this date. . Pt would continue to benefit from skilled therapy interventions to address remaining impairments, improve mobility and strength,  and progress toward goal completion and prepare for d/c including finalizing HEP ;  while reducing risk for re-injury or further decline. Pain complaints after session 0/10       Plan for Next Session:        Time In / Time Out:    see myrnaal         Timed Code/Total Treatment Minutes:        Next Progress Note due:        Plan of Care Interventions:  [x] Therapeutic Exercise  [] Modalities:  [x] Therapeutic Activity     [] Ultrasound  [] Estim  [] Gait Training      [] Cervical Traction [] Lumbar Traction  [x] Neuromuscular Re-education    [] Cold/hotpack [] Iontophoresis   [x] Instruction in HEP      [x] Vasopneumatic   [] Dry Needling    [x] Manual Therapy               [] Aquatic Therapy              Electronically signed by:  Arpita Schwartz PT, 10/24/2022, 2:01 PM

## 2022-10-24 NOTE — PROGRESS NOTES
Physical Therapy: Initial Evaluation    Patient: Reese Diaz (85 y.o. female)   Examination Date:   Plan of Care Certification Period: 10/24/2022 to        :  1955 ;    Confirmed:  MRN: [id number]  CSN: [id number]   Insurance: Payor: Holly Luna / Plan: [facility] [employer] / Product Type: *No Product type* /   Insurance ID: [id number] - (Other) Secondary Insurance (if applicable):    Referring Physician: MD Elisha Ybarra   PCP: Nazia Brenner MD Visits to Date/Visits Approved:   /      No Show/Cancelled Appts:   /       Medical Diagnosis: Arm injury [S49.90XA] R shoulder/arm pain  Treatment Diagnosis: R UE pain     PERTINENT MEDICAL HISTORY   Patient Assessed for Rehabilitation Services: Yes       Medical History: Chart Reviewed: Yes   Past Medical History:   Diagnosis Date    Hyperlipidemia      Surgical History:   Past Surgical History:   Procedure Laterality Date    ENDOMETRIAL ABLATION      KNEE ARTHROSCOPY  4/3/2012    R knee     KNEE ARTHROSCOPY Left 2014    OTHER SURGICAL HISTORY      TUMOR REMOVED FROM NECK       Medications: No current outpatient medications on file. Allergies: Patient has no known allergies.       SUBJECTIVE EXAMINATION      ,           Subjective History:    Subjective: 21 - R arm injury- fell/landed on R arm arm  Additional Pertinent Hx (if applicable):            Learning/Language:       Pain Screening        Functional Status         Social History:       Occupation/Interests:  Occupation: Retired  Leisure & Hobbies: bowling    Prior Level of Function:    Independent        Current Level of Function:         IADL Comments: continues with all IADL despite pain  ADL Assistance: Independent  Ambulation Assistance: Independent  Transfer Assistance: Independent  Active : Yes    OBJECTIVE EXAMINATION   Restrictions:        Position Activity Restriction  Other position/activity restrictions: none    Review of Systems:  Overall Orientation Status: Within Normal Limits  Patient affect[de-identified] Normal  Follows Commands: Within Functional Limits    VBI Screening / Lumbar Screening:        Regional Screen:         Observations:       Palpation:        Ambulation/Gait (if applicable):       Balance Screen:       Neuro Screen:    Left AROM  Right AROM               Shoulder IR to rear pocket     Left PROM  Right PROM                    Left Strength  Right Strength               Shoulder strength 4/5     Cervical Assessment             Thoracic Assessment            Lumbar Assessment           Trunk Strength           Muscle Length/Flexibility:      Joint Mobility (if applicable):        Special Tests: R shoulder impingement test painful       Balance/Git Assessment(s) Performed: Additional Finding(s) (if applicable):           ASSESSMENT     Impression:      Body Structures, Functions, Activity Limitations Requiring Skilled Therapeutic Intervention: Decreased functional mobility , Decreased ROM    Statement of Medical Necessity: Physical Therapy is both indicated and medically necessary as outlined in the POC to increase the likelihood of meeting the functionally related goals stated below. Patient's Activity Tolerance:        Patient's rehabilitation potential/prognosis is considered to be:       Factors which may impact rehabilitation potential include:          GOALS   Patient Goal(s): regain R UE function  Short Term Goals Completed by   Goal Status                                                                   Long Term Goals Completed by 6 weeks, plan expires 12/16/22 Goal Status   patient will be independent with HEP     patient will be able to touch T8 with R thumb                                                          TREATMENT PLAN       Requires PT Follow-Up: Yes    Pt. actively involved in establishing Plan of Care and Goals: Yes  Patient/ Caregiver education and instruction:               Treatment may include any combination of the following:       Frequency / Duration:  Patient to be seen   for   weeks      Eval Complexity:    Decision Making: Low Complexity  History: PF- none  Exam: ROM/ strength  Clinical Presentation: evolving    PT Treatment Completed: Therapy Time  Individual Time In: 9831       Individual Time Out: 1155  Minutes: 52  Timed Code Treatment Minutes: 20 Minutes     Therapist Signature: Micky Woods PT    Date: 78/90/7840     I certify that the above Therapy Services are being furnished while the patient is under my care. I agree with the treatment plan and certify that this therapy is necessary. Physician's Signature:  ___________________________   Date:_______                                                                   Raffaele Hannah MD        Physician Comments: _______________________________________________    Please sign and return to [address] [facility]. Please fax to the location listed below.  [facility] YOU for this referral!    [address]  [address] [address]  Dept: [address]: [phone]       POC NOTE   Physical Therapy

## 2024-05-30 ENCOUNTER — HOSPITAL ENCOUNTER (OUTPATIENT)
Dept: PHYSICAL THERAPY | Age: 69
Setting detail: THERAPIES SERIES
Discharge: HOME | End: 2024-05-30
Payer: MEDICARE

## 2024-05-30 PROCEDURE — 97162 PT EVAL MOD COMPLEX 30 MIN: CPT

## 2024-05-30 NOTE — PROGRESS NOTES
hardest things at home: 1) walking on uneven ground 2) twisting movements 3)   Patient goals: to get rid of the pain     Orientation: WNL    Objective  FIS: no change  RFIS: increased, peripheralized  EIS: no change  LINA: improved, centralized  R lateral shift: no change  L lateral shift: no change - peripheralized when sat down after with paresthesias.     Strength LE  Hip:   Right Left   Hip flexion         4/5        5/5   Hip abd        4+/5        4+/5   Hip add        4-4+/5        4+/5     Knee:    Right Left   Knee flexion        4+/5        5/5   Knee extension        4+/5        5/5     Ankle:   Right Left   Ankle DF         4+/5        5/5      Transfers  Sit to Stand: Independent  Stand to sit: Independent    Ambulation  Ambulation?: Yes  WB Status: FWB  Surface: level tile  Device: none  Assistance: Independent  Quality of Gait: Pt demonstrated steady gait with R lateral lean and R shoulder lower than L. Pt demonstrated slightly decreased R arm swing. She demonstrated decreased speed.   Distance: 273.8 feet    Additional Tests: Oswestry: 14/50 = 28% disability   2MWT: 273.8 feet    Assessment   Decreased functional mobility ;Decreased strength;Decreased endurance;Decreased high-level IADLs;Decreased ADL status;Decreased ROM;   Assessment: Pt is a pleasant 69 yo female who would benefit from skilled PT to address decreased ROM, strength, balance, endurance, functional mobility, and increased pain.     Prognosis: Good  Discharge Recommendations: Patient would benefit from additional therapy;Continue to assess pending progress,   Requires PT Follow Up: Yes  Activity Tolerance: Patient Tolerated treatment well.    Treatment Administered: See flowsheet  Patient Education: See flowsheet  Learning Style: Any    Plan   Plan of care initiated  Frequency and duration of tx:  2x/week up to 6 weeks  Barriers include: none  Treatment:  Therapeutic exercises including ROM, PREs, stretching, strengthening, and

## 2024-05-30 NOTE — FLOWSHEET NOTE
Outpatient Physical Therapy  [place]           [] Phone: [phone]   Fax: [phone]  [place]           [x] Phone: [phone]   Fax: [phone]        Physical Therapy Daily Treatment Note  Date:  2024    Patient Name:  Virgie Mejiarn  \"hCela\"  :  1955  MRN: [id number]  Restrictions/Precautions: No data recorded   Diagnosis:   Sciatica, right side [M54.31]    Date of Injury/Surgery: chronic >1 year  Treatment Diagnosis:   M62.81 muscle weakness, R26.89 abnormality of gait   Insurance/Certification information:   1) [employer]  2)    Referring Physician:  Mello Lanier MD     PCP: Mello Lanier MD  Plan of care signed (Y/N):  eval faxed  Outcome Measure: Oswestry:  = 28% disability   2MWT: 273.8 feet  Visit# / total visits:   /10  Pain level: 3/10   Goals:     Patient goals:  to get rid of the pain     Long term goals to be achieved by 2024:   Long term goal 1: Pt will demonstrate I with current HEP as prescribed in order to increase strength and decrease pain.   Long term goal 2: Pt will demonstrate R hip flexion strength at least 4+/5 in order to improve strength.   Long term goal 3: Pt will demonstrate R knee and ankle strength 5/5 in order to improve strength.   Long term goal 4: Pt will demonstrate an Oswestry score of no more than 18% disability in order to improve quality of life.   Long term goal 5: Pt will demonstrate the ability to safely ambulate 300 feet in the 2 minute walk test in order to improve functional mobility.   Long term goal 6: Pt will report worst pain in the last week no more than 4/10 in order to improve quality of life      Subjective:  See eval     Any changes in Ambulatory Summary Sheet?  None    Objective:  See eval     Exercises: (No more than 4 columns)   Exercise/Equipment Date: 24 Date Date           WARM UP      NuStep               TABLE      Glute set in hook lying      Bridge w/ ball squeeze      TA

## 2024-05-30 NOTE — PRE-CERTIFICATION NOTE
Patient approved for 10 visits from 5/30/24 to 7/31/24.    Cpt codes approved:    27855  55646  22031  56482  83173  16748    Auth#  [id number]

## 2024-06-04 ENCOUNTER — APPOINTMENT (OUTPATIENT)
Dept: PHYSICAL THERAPY | Age: 69
End: 2024-06-04
Payer: MEDICARE

## 2024-06-06 ENCOUNTER — HOSPITAL ENCOUNTER (OUTPATIENT)
Dept: PHYSICAL THERAPY | Age: 69
Setting detail: THERAPIES SERIES
Discharge: HOME | End: 2024-06-06
Payer: MEDICARE

## 2024-06-06 PROCEDURE — 97110 THERAPEUTIC EXERCISES: CPT

## 2024-06-06 NOTE — FLOWSHEET NOTE
Outpatient Physical Therapy  [place]           [] Phone: [phone]   Fax: [phone]  [place]           [x] Phone: [phone]   Fax: [phone]        Physical Therapy Daily Treatment Note  Date:  2024    Patient Name:  Virgie Mejiarn  \"Chela\"  :  1955  MRN: [id number]  Restrictions/Precautions: No data recorded   Diagnosis:   Sciatica, right side [M54.31]    Date of Injury/Surgery: chronic >1 year  Treatment Diagnosis:   M62.81 muscle weakness, R26.89 abnormality of gait   Insurance/Certification information:   1) [employer]  2)    Referring Physician:  Mello Lanier MD     PCP: Mello Lanier MD  Plan of care signed (Y/N):  eval faxed  Outcome Measure: Oswestry:  = 28% disability   2MWT: 273.8 feet  Visit# / total visits:   /10  Pain level: 6/10   Goals:     Patient goals:  to get rid of the pain     Long term goals to be achieved by 2024:   Long term goal 1: Pt will demonstrate I with current HEP as prescribed in order to increase strength and decrease pain.   Long term goal 2: Pt will demonstrate R hip flexion strength at least 4+/5 in order to improve strength.   Long term goal 3: Pt will demonstrate R knee and ankle strength 5/5 in order to improve strength.   Long term goal 4: Pt will demonstrate an Oswestry score of no more than 18% disability in order to improve quality of life.   Long term goal 5: Pt will demonstrate the ability to safely ambulate 300 feet in the 2 minute walk test in order to improve functional mobility.   Long term goal 6: Pt will report worst pain in the last week no more than 4/10 in order to improve quality of life      Subjective:      Any changes in Ambulatory Summary Sheet?  None    Objective:  See eval     Exercises: (No more than 4 columns)   Exercise/Equipment Date: 24 Date: 24 Date           WARM UP      NuStep     X10' S5/A8 Lvl 3          TABLE      Glute set in hook lying  1x10 5\"    Bridge w/ ball

## 2024-06-10 ENCOUNTER — HOSPITAL ENCOUNTER (OUTPATIENT)
Dept: PHYSICAL THERAPY | Age: 69
Setting detail: THERAPIES SERIES
Discharge: HOME | End: 2024-06-10
Payer: MEDICARE

## 2024-06-10 PROCEDURE — 97110 THERAPEUTIC EXERCISES: CPT

## 2024-06-10 NOTE — FLOWSHEET NOTE
Outpatient Physical Therapy  [place]           [] Phone: [phone]   Fax: [phone]  [place]           [x] Phone: [phone]   Fax: [phone]        Physical Therapy Daily Treatment Note  Date:  6/10/2024    Patient Name:  Virgie Mejiarn  \"Chela\"  :  1955  MRN: [id number]  Restrictions/Precautions: No data recorded   Diagnosis:   Sciatica, right side [M54.31]    Date of Injury/Surgery: chronic >1 year  Treatment Diagnosis:   M62.81 muscle weakness, R26.89 abnormality of gait   Insurance/Certification information:   1) [employer]  2)    Referring Physician:  Mello Lanier MD     PCP: Mello Lanier MD  Plan of care signed (Y/N):  eval faxed  Outcome Measure: Oswestry: 1450 = 28% disability   2MWT: 273.8 feet  Visit# / total visits:   2/10  Pain level: 5/10   Goals:     Patient goals:  to get rid of the pain     Long term goals to be achieved by 2024:   Long term goal 1: Pt will demonstrate I with current HEP as prescribed in order to increase strength and decrease pain.   Long term goal 2: Pt will demonstrate R hip flexion strength at least 4+/5 in order to improve strength.   Long term goal 3: Pt will demonstrate R knee and ankle strength 5/5 in order to improve strength.   Long term goal 4: Pt will demonstrate an Oswestry score of no more than 18% disability in order to improve quality of life.   Long term goal 5: Pt will demonstrate the ability to safely ambulate 300 feet in the 2 minute walk test in order to improve functional mobility.   Long term goal 6: Pt will report worst pain in the last week no more than 4/10 in order to improve quality of life      Subjective:  Pt reports pain at least 5/10.  She reports she felt better after last session, but it did not last.     Any changes in Ambulatory Summary Sheet?  None    Objective:  See eval     Exercises: (No more than 4 columns)   Exercise/Equipment Date: 24 Date: 24 Date: 6/10/24           WARM

## 2024-06-13 ENCOUNTER — HOSPITAL ENCOUNTER (OUTPATIENT)
Dept: PHYSICAL THERAPY | Age: 69
Setting detail: THERAPIES SERIES
Discharge: HOME | End: 2024-06-13
Payer: MEDICARE

## 2024-06-13 PROCEDURE — 97110 THERAPEUTIC EXERCISES: CPT

## 2024-06-13 NOTE — FLOWSHEET NOTE
Outpatient Physical Therapy  [place]           [] Phone: [phone]   Fax: [phone]  [place]           [x] Phone: [phone]   Fax: [phone]        Physical Therapy Daily Treatment Note  Date:  2024    Patient Name:  Virgie Mejiarn  \"Chela\"  :  1955  MRN: [id number]  Restrictions/Precautions: No data recorded   Diagnosis:   Sciatica, right side [M54.31]    Date of Injury/Surgery: chronic >1 year  Treatment Diagnosis:   M62.81 muscle weakness, R26.89 abnormality of gait   Insurance/Certification information:   1) [employer]  2)    Referring Physician:  Mello Lanier MD     PCP: Mello Lanier MD  Plan of care signed (Y/N):  eval faxed  Outcome Measure: Oswestry:  = 28% disability   2MWT: 273.8 feet  Visit# / total visits:   3/10  Pain level: 4/10   Goals:     Patient goals:  to get rid of the pain     Long term goals to be achieved by 2024:   Long term goal 1: Pt will demonstrate I with current HEP as prescribed in order to increase strength and decrease pain.   Long term goal 2: Pt will demonstrate R hip flexion strength at least 4+/5 in order to improve strength.   Long term goal 3: Pt will demonstrate R knee and ankle strength 5/5 in order to improve strength.   Long term goal 4: Pt will demonstrate an Oswestry score of no more than 18% disability in order to improve quality of life.   Long term goal 5: Pt will demonstrate the ability to safely ambulate 300 feet in the 2 minute walk test in order to improve functional mobility.   Long term goal 6: Pt will report worst pain in the last week no more than 4/10 in order to improve quality of life      Subjective:  Pt reports pain at least 4/10.  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     Any changes in Ambulatory Summary Sheet?  None    Objective:  See eval     Exercises: (No more than 4 columns)   Exercise/Equipment Date: 24 Date: 24 Date: 6/10/24 Date: 24

## 2024-06-17 ENCOUNTER — HOSPITAL ENCOUNTER (OUTPATIENT)
Dept: PHYSICAL THERAPY | Age: 69
Setting detail: THERAPIES SERIES
Discharge: HOME | End: 2024-06-17
Payer: MEDICARE

## 2024-06-17 PROCEDURE — 97110 THERAPEUTIC EXERCISES: CPT

## 2024-06-17 NOTE — FLOWSHEET NOTE
Outpatient Physical Therapy  [place]           [] Phone: [phone]   Fax: [phone]  [place]           [x] Phone: [phone]   Fax: [phone]        Physical Therapy Daily Treatment Note  Date:  2024    Patient Name:  Virgie Mejiarn  \"Chela\"  :  1955  MRN: [id number]  Restrictions/Precautions: No data recorded   Diagnosis:   Sciatica, right side [M54.31]    Date of Injury/Surgery: chronic >1 year  Treatment Diagnosis:   M62.81 muscle weakness, R26.89 abnormality of gait   Insurance/Certification information:   1) [employer]  2)    Referring Physician:  Mello Lanier MD     PCP: Mello Lanier MD  Plan of care signed (Y/N):  eval faxed  Outcome Measure: Oswestry:  = 28% disability   2MWT: 273.8 feet  Visit# / total visits:   4/10  Pain level: 3/10   Goals:     Patient goals:  to get rid of the pain     Long term goals to be achieved by 2024:   Long term goal 1: Pt will demonstrate I with current HEP as prescribed in order to increase strength and decrease pain.   Long term goal 2: Pt will demonstrate R hip flexion strength at least 4+/5 in order to improve strength.   Long term goal 3: Pt will demonstrate R knee and ankle strength 5/5 in order to improve strength.   Long term goal 4: Pt will demonstrate an Oswestry score of no more than 18% disability in order to improve quality of life.   Long term goal 5: Pt will demonstrate the ability to safely ambulate 300 feet in the 2 minute walk test in order to improve functional mobility.   Long term goal 6: Pt will report worst pain in the last week no more than 4/10 in order to improve quality of life      Subjective:  Pt reports pain at least 3/10.  She reports she has been walking a lot.     Any changes in Ambulatory Summary Sheet?  None    Objective:  See eval     Exercises: (No more than 4 columns)   Exercise/Equipment Date: 24 Date: 24 Date: 6/10/24 Date: 24 Date: 24

## 2024-06-20 ENCOUNTER — HOSPITAL ENCOUNTER (OUTPATIENT)
Dept: PHYSICAL THERAPY | Age: 69
Setting detail: THERAPIES SERIES
Discharge: HOME | End: 2024-06-20
Payer: MEDICARE

## 2024-06-20 PROCEDURE — 97110 THERAPEUTIC EXERCISES: CPT

## 2024-06-20 NOTE — FLOWSHEET NOTE
Outpatient Physical Therapy  [place]           [] Phone: [phone]   Fax: [phone]  [place]           [x] Phone: [phone]   Fax: [phone]        Physical Therapy Daily Treatment Note  Date:  2024    Patient Name:  Virgie Mejiarn  \"Chela\"  :  1955  MRN: [id number]  Restrictions/Precautions: No data recorded   Diagnosis:   Sciatica, right side [M54.31]    Date of Injury/Surgery: chronic >1 year  Treatment Diagnosis:   M62.81 muscle weakness, R26.89 abnormality of gait   Insurance/Certification information:   1) [employer]  2)    Referring Physician:  Mello Lanier MD     PCP: Mello Lanier MD  Plan of care signed (Y/N):  eval faxed  Outcome Measure: Oswestry: 50 = 28% disability   2MWT: 273.8 feet  Visit# / total visits:   5/10  Pain level: 510   Goals:     Patient goals:  to get rid of the pain     Long term goals to be achieved by 2024:   Long term goal 1: Pt will demonstrate I with current HEP as prescribed in order to increase strength and decrease pain.   Long term goal 2: Pt will demonstrate R hip flexion strength at least 4+/5 in order to improve strength.   Long term goal 3: Pt will demonstrate R knee and ankle strength 5/5 in order to improve strength.   Long term goal 4: Pt will demonstrate an Oswestry score of no more than 18% disability in order to improve quality of life.   Long term goal 5: Pt will demonstrate the ability to safely ambulate 300 feet in the 2 minute walk test in order to improve functional mobility.   Long term goal 6: Pt will report worst pain in the last week no more than 4/10 in order to improve quality of life      Subjective:  Pt reports pain 5/10.  She reports she has been busy.  She reports last night her leg was tingling clear down to her foot. She reports she did not take tylenol this am either.     Any changes in Ambulatory Summary Sheet?  None    Objective:  See eval     Exercises: (No more than 4 columns)

## 2024-06-28 ENCOUNTER — HOSPITAL ENCOUNTER (OUTPATIENT)
Dept: PHYSICAL THERAPY | Age: 69
Setting detail: THERAPIES SERIES
Discharge: HOME | End: 2024-06-28
Payer: MEDICARE

## 2024-06-28 PROCEDURE — 97110 THERAPEUTIC EXERCISES: CPT

## 2024-06-28 NOTE — FLOWSHEET NOTE
Outpatient Physical Therapy  [place]           [] Phone: [phone]   Fax: [phone]  [place]           [x] Phone: [phone]   Fax: [phone]        Physical Therapy Daily Treatment Note  Date:  2024    Patient Name:  Virgie Mejiarn  \"Chela\"  :  1955  MRN: [id number]  Restrictions/Precautions: No data recorded   Diagnosis:   Sciatica, right side [M54.31]    Date of Injury/Surgery: chronic >1 year  Treatment Diagnosis:   M62.81 muscle weakness, R26.89 abnormality of gait   Insurance/Certification information:   1) [employer]  2)    Referring Physician:  Mello Lanier MD     PCP: Mello Lanier MD  Plan of care signed (Y/N):  eval faxed  Outcome Measure: Oswestry: 50 = 28% disability   2MWT: 273.8 feet  Visit# / total visits:   5/10  Pain level: 5/10   Goals:     Patient goals:  to get rid of the pain     Long term goals to be achieved by 2024:   Long term goal 1: Pt will demonstrate I with current HEP as prescribed in order to increase strength and decrease pain.   Long term goal 2: Pt will demonstrate R hip flexion strength at least 4+/5 in order to improve strength.   Long term goal 3: Pt will demonstrate R knee and ankle strength 5/5 in order to improve strength.   Long term goal 4: Pt will demonstrate an Oswestry score of no more than 18% disability in order to improve quality of life.   Long term goal 5: Pt will demonstrate the ability to safely ambulate 300 feet in the 2 minute walk test in order to improve functional mobility.   Long term goal 6: Pt will report worst pain in the last week no more than 4/10 in order to improve quality of life      Subjective:  Pt reports pain 2/10.  She reports she had pain 3-4/10 when she woke up this am and has taken pain medication. She reports first standing in the am is painful. She reports that she was camping this week and sleeping on an air mattress.     Any changes in Ambulatory Summary Sheet?

## 2024-07-01 ENCOUNTER — HOSPITAL ENCOUNTER (OUTPATIENT)
Dept: PHYSICAL THERAPY | Age: 69
Setting detail: THERAPIES SERIES
Discharge: HOME | End: 2024-07-01
Payer: MEDICARE

## 2024-07-01 PROCEDURE — 97110 THERAPEUTIC EXERCISES: CPT

## 2024-07-09 ENCOUNTER — HOSPITAL ENCOUNTER (OUTPATIENT)
Dept: PHYSICAL THERAPY | Age: 69
Setting detail: THERAPIES SERIES
Discharge: HOME | End: 2024-07-09
Payer: MEDICARE

## 2024-07-09 PROCEDURE — 97110 THERAPEUTIC EXERCISES: CPT

## 2024-07-09 NOTE — FLOWSHEET NOTE
Outpatient Physical Therapy  [place]           [] Phone: [phone]   Fax: [phone]  [place]           [x] Phone: [phone]   Fax: [phone]        Physical Therapy Daily Treatment Note  Date:  2024    Patient Name:  Virgie Mejiarn  \"Chela\"  :  1955  MRN: [id number]  Restrictions/Precautions: No data recorded   Diagnosis:   Sciatica, right side [M54.31]    Date of Injury/Surgery: chronic >1 year  Treatment Diagnosis:   M62.81 muscle weakness, R26.89 abnormality of gait   Insurance/Certification information:   1) [employer]  2)    Referring Physician:  Mello Lanier MD     PCP: Mello Lanier MD  Plan of care signed (Y/N):  eval faxed  Outcome Measure: Oswestry:  = 28% disability   2MWT: 273.8 feet  Visit# / total visits:   9/10  Pain level: 3/10   Goals:     Patient goals:  to get rid of the pain     Long term goals to be achieved by 2024:   Long term goal 1: Pt will demonstrate I with current HEP as prescribed in order to increase strength and decrease pain.  - MET, discharge goal.   Long term goal 2: Pt will demonstrate R hip flexion strength at least 4+/5 in order to improve strength. - MET, discharge goal.   Long term goal 3: Pt will demonstrate R knee and ankle strength 5/5 in order to improve strength.  - MET, discharge goal.   Long term goal 4: Pt will demonstrate an Oswestry score of no more than 18% disability in order to improve quality of life. - not met, discharge goal  Long term goal 5: Pt will demonstrate the ability to safely ambulate 300 feet in the 2 minute walk test in order to improve functional mobility.  - MET, discharge goal.   Long term goal 6: Pt will report worst pain in the last week no more than 4/10 in order to improve quality of life   - not met, discharge goal    Subjective:  3/10.  Pt reports she is not doing too bad. She reports she has been using a vibration plate. She reports she continues to have pain into her foot

## 2024-07-12 NOTE — PROGRESS NOTES
[facility] Outpatient Physical Therapy  [address] [address]  Phone: [phone]  Fax: [phone]      Physician: Dr Mello Lanier MD         From: Daphney Gallegos, PT, DPT     Patient: Virgie Armstrong                      : 1955  Diagnosis: Sciatica, right side [M54.31]          Date: 2024  Treatment Diagnosis:M62.81 muscle weakness, R26.89 abnormality of gait       [x]  Progress Note                []  Discharge Note    Total Visits to date:  9  Cancels/No-shows to date:  0    Subjective: At the last visit pt reported pain  3/10.  She reported she is not doing too bad, but she continues to have pain into her foot nica in the am and it improves as the day goes on, unless she does too much. Pt reports her worst pain in the last week was 5/10 on the  when busy with the car show. Pt reports she is doing better overall, but she continues to have issues frequently. Pt reports decreased tingling overall but she continues to have it mainly when getting out of the car.       Plan of Care/Treatment to date:  [x] Therapeutic Exercise    [] Modalities:  [x] Therapeutic Activity     [] Ultrasound  [] Electric Stimulation  [] Gait Training      [] Cervical Traction    [] Lumbar Traction  [x] Neuromuscular Re-education  [] Cold/hotpack [] Iontophoresis  [x] Instruction in HEP      Other:  [] Manual Therapy       []  Vasopneumatic  [x] Aquatic Therapy       []                          Objective/Significant Findings At Last Visit/Comments:  2MWT: 346.4 feet  Oswestry:16/50 = 32% disability     Strength LE  Hip:    Right   Hip flexion         4+/5   Hip abd        5/5   Hip add        5/5      Knee:     Right   Knee flexion        5/5   Knee extension        5/5      Ankle:    Right   Ankle DF         5/5      Assessment: Pt has made excellent progress in physical therapy and would benefit from continuation of skilled services transitioning from the land to aquatic

## 2024-10-25 ENCOUNTER — HOSPITAL ENCOUNTER (OUTPATIENT)
Dept: PHYSICAL THERAPY | Age: 69
Setting detail: THERAPIES SERIES
Discharge: HOME | End: 2024-10-25
Payer: MEDICARE

## 2024-10-25 PROCEDURE — 97164 PT RE-EVAL EST PLAN CARE: CPT

## 2024-10-25 PROCEDURE — 97110 THERAPEUTIC EXERCISES: CPT

## 2024-10-25 NOTE — PROGRESS NOTES
[facility] Outpatient Physical Therapy  [address] [address]  Phone: [phone]  Fax: [phone]      Physician: Dr Mello Lanier MD       From: Daphney Gallegos, PT, DPT     Patient: Virgie Armstrong                    : 1955  Diagnosis: Sciatica, right side [M54.31]        Date: 10/25/2024  Treatment Diagnosis:  M62.81 muscle weakness, R26.89 abnormality of gait     [x]  Progress Note                []  Discharge Note    Total Visits to date:   1 Cancels/No-shows to date:  0    Subjective: not rated/10. Pt reports that she has pain in R LE and tingling down into her foot.  She reports she tries to keep up with her HEP, but she is very busy.       Plan of Care/Treatment to date:  [x] Therapeutic Exercise    [x] Modalities:  [x] Therapeutic Activity     [] Ultrasound  [x] Electric Stimulation  [x] Gait Training      [] Cervical Traction    [] Lumbar Traction  [x] Neuromuscular Re-education  [] Cold/hotpack [] Iontophoresis  [x] Instruction in HEP      Other:  [x] Manual Therapy       []  Vasopneumatic  [x] Aquatic Therapy       []                          Objective/Significant Findings At Last Visit/Comments:  2MWT: 304.8  Pt performed with R SB and R shoulder lower than L.   Oswestry:23/50 = 46% disability      Strength LE  Hip:    Right Left:    Hip flexion         5/5 5/5   Hip abd        5/5 5/5   Hip add        5/5 5/5      Knee:     Right Left:    Knee flexion        5/5 5/5   Knee extension        5/5 5/5      Assessment: Pt is a pleasant 68 yo female who would benefit from skilled PT to address decreased flexibility, strength, and functional mobility, as well as increased pain.       Goal Status:  [] Achieved [] Partially Achieved  [] Not Achieved   Long term goal 1: Pt will demonstrate I with current HEP as prescribed in order to increase strength and decrease pain.  - continue with updated HEP  Long term goal 2: Pt will demonstrate R hip flexion strength at

## 2024-10-25 NOTE — FLOWSHEET NOTE
Outpatient Physical Therapy  [place]           [] Phone: [phone]   Fax: [phone]  [place]           [x] Phone: [phone]   Fax: [phone]        Physical Therapy Daily Treatment Note  Date:  10/25/2024    Patient Name:  Virgie Mejiarn  \"Chela\"  :  1955  MRN: [id number]  Restrictions/Precautions: No data recorded   Diagnosis:   Sciatica, right side [M54.31]    Date of Injury/Surgery: chronic >1 year  Treatment Diagnosis:   M62.81 muscle weakness, R26.89 abnormality of gait   Insurance/Certification information:   1) [employer] Medicare  2)    Referring Physician:  Mello Lanier MD     PCP: Mello Lanier MD  Plan of care signed (Y/N):  eval faxed  Outcome Measure: Oswestry:  = 28% disability   2MWT: 273.8 feet  Visit# / total visits:   9/10  Pain level: Not rated/10   Goals:     Patient goals:  to get rid of the pain     Updated Goals to be achieved by 2024 :  1) Continue Above  2) Pt will demonstrate the ability to safely complete at least 340 feet on the 2 minute walk test with no increase in pain in order to improve functional mobility.   3) Pt will demonstrate an Oswestry score of no more than 30% disability in order to improve quality of life.  4)  Pt will report worst pain in the last week no more than 4/10 in order to improve quality of life      Subjective: not rated/10.  Pt reports that she has pain in R     Any changes in Ambulatory Summary Sheet?  None    Objective:  2MWT: 304.8  Pt performed with R SB and R shoulder lower than L.   Oswestry:50 = 46% disability     Strength LE  Hip:   Right Left:    Hip flexion         5/5 5/5   Hip abd        5/5 5/5   Hip add        5/5 5/5     Knee:    Right Left:    Knee flexion        5/5 5/5   Knee extension        5/5 5/5     Exercises: (No more than 4 columns)   Exercise/Equipment Date: 24 Date: 10/25/24          WARM UP     NuStep    X10' S7/A10 Lvl 4 X7' S6/A10 Lvl 4        TABLE     Glute set in

## 2024-10-28 ENCOUNTER — HOSPITAL ENCOUNTER (OUTPATIENT)
Dept: PHYSICAL THERAPY | Age: 69
Setting detail: THERAPIES SERIES
Discharge: HOME | End: 2024-10-28
Payer: MEDICARE

## 2024-10-28 PROCEDURE — 97110 THERAPEUTIC EXERCISES: CPT

## 2024-10-28 PROCEDURE — 97530 THERAPEUTIC ACTIVITIES: CPT

## 2024-10-28 NOTE — FLOWSHEET NOTE
Outpatient Physical Therapy  [place]           [] Phone: [phone]   Fax: [phone]  [place]           [x] Phone: [phone]   Fax: [phone]        Physical Therapy Daily Treatment Note  Date:  10/28/2024    Patient Name:  Virgie Mejiarn  \"Chela\"  :  1955  MRN: [id number]  Restrictions/Precautions: No data recorded   Diagnosis:   Sciatica, right side [M54.31]    Date of Injury/Surgery: chronic >1 year  Treatment Diagnosis:   M62.81 muscle weakness, R26.89 abnormality of gait   Insurance/Certification information:   1) [employer] Medicare  2)    Referring Physician:  Mello Lanier MD     PCP: Mello Lanier MD  Plan of care signed (Y/N):  eval faxed  Outcome Measure: Oswestry:  = 28% disability   2MWT: 273.8 feet  Visit# / total visits:   9/10  Pain level: Not rated/10   Goals:     Patient goals:  to get rid of the pain     Updated Goals to be achieved by 2024 :  1) Pt will demonstrate I with current HEP as prescribed in order to increase strength and decrease pain.   2) Pt will demonstrate the ability to safely complete at least 340 feet on the 2 minute walk test with no increase in pain in order to improve functional mobility.   3) Pt will demonstrate an Oswestry score of no more than 30% disability in order to improve quality of life.  4)  Pt will report worst pain in the last week no more than 4/10 in order to improve quality of life      Subjective: 2/10.  Pt reports that she has min pain in R foot and tingling. Pt reports she has been stretching since her eval and feels better. She reports that she has been more conscious of wearing shoes with arch support and her sleeping position as well. She reports that she     Any changes in Ambulatory Summary Sheet?  None    Objective:  Pt tolerated stretches well    Exercises: (No more than 4 columns)   Exercise/Equipment Date: 10/25/24 Date: 10/28/24          WARM UP     NuStep    X7' S6/A10 Lvl 4 X10' S6/A10 Lvl

## 2024-10-28 NOTE — PRE-CERTIFICATION NOTE
Patient approved for 10 visits from 10/18/24 to 11/29/24.    Cpt codes approved:    92683  47517  45515  33114  21692  01635  [id number]    Auth#  [employer]- [id number]

## 2024-10-31 ENCOUNTER — HOSPITAL ENCOUNTER (OUTPATIENT)
Dept: PHYSICAL THERAPY | Age: 69
Setting detail: THERAPIES SERIES
Discharge: HOME | End: 2024-10-31
Payer: MEDICARE

## 2024-10-31 PROCEDURE — 97530 THERAPEUTIC ACTIVITIES: CPT

## 2024-10-31 PROCEDURE — 97110 THERAPEUTIC EXERCISES: CPT

## 2024-10-31 NOTE — FLOWSHEET NOTE
Outpatient Physical Therapy  [place]           [] Phone: [phone]   Fax: [phone]  [place]           [x] Phone: [phone]   Fax: [phone]        Physical Therapy Daily Treatment Note  Date:  10/31/2024    Patient Name:  Virgie Mejiarn  \"Chela\"  :  1955  MRN: [id number]  Restrictions/Precautions: No data recorded   Diagnosis:   Sciatica, right side [M54.31]    Date of Injury/Surgery: chronic >1 year  Treatment Diagnosis:   M62.81 muscle weakness, R26.89 abnormality of gait   Insurance/Certification information:   1) [employer] Medicare  2)    Referring Physician:  Mello Lanier MD     PCP: Mello Lanier MD  Plan of care signed (Y/N):  eval faxed  Outcome Measure: Oswestry:  = 28% disability   2MWT: 273.8 feet  Visit# / total visits:   3/10  Pain level: 4-5/10   Goals:     Patient goals:  to get rid of the pain     Updated Goals to be achieved by 2024 :  1) Pt will demonstrate I with current HEP as prescribed in order to increase strength and decrease pain.   2) Pt will demonstrate the ability to safely complete at least 340 feet on the 2 minute walk test with no increase in pain in order to improve functional mobility.   3) Pt will demonstrate an Oswestry score of no more than 30% disability in order to improve quality of life.  4)  Pt will report worst pain in the last week no more than 4/10 in order to improve quality of life      Subjective:  4-5/10.  Pt reports she bowled Tuesday morning and Wednesday at noon. She reports increased pain since then. She reports paresthesias in R foot prior to starting. Pt also reports that her calf on the L LE just does not feel right. She reports she can feel it when she is lying in bed at night.     Any changes in Ambulatory Summary Sheet?  None    Objective:  Pt tolerated stretches well    Exercises: (No more than 4 columns)   Exercise/Equipment Date: 10/25/24 Date: 10/28/24 Date: 10/31/24           WARM UP

## 2024-11-05 ENCOUNTER — HOSPITAL ENCOUNTER (OUTPATIENT)
Dept: PHYSICAL THERAPY | Age: 69
Setting detail: THERAPIES SERIES
Discharge: HOME | End: 2024-11-05
Payer: MEDICARE

## 2024-11-05 PROCEDURE — 97530 THERAPEUTIC ACTIVITIES: CPT

## 2024-11-05 PROCEDURE — 97110 THERAPEUTIC EXERCISES: CPT

## 2024-11-05 NOTE — FLOWSHEET NOTE
Outpatient Physical Therapy  [place]           [] Phone: [phone]   Fax: [phone]  [place]           [x] Phone: [phone]   Fax: [phone]        Physical Therapy Daily Treatment Note  Date:  2024    Patient Name:  Virgie Mejiarn  \"Chela\"  :  1955  MRN: [id number]  Restrictions/Precautions: No data recorded   Diagnosis:   Sciatica, right side [M54.31]    Date of Injury/Surgery: chronic >1 year  Treatment Diagnosis:   M62.81 muscle weakness, R26.89 abnormality of gait   Insurance/Certification information:   1) [employer] Medicare  2)    Referring Physician:  Mello Lanier MD     PCP: Mello Lanier MD  Plan of care signed (Y/N):  eval faxed  Outcome Measure: Oswestry:  = 28% disability   2MWT: 273.8 feet  Visit# / total visits:   4/10  Pain level: 4-510   Goals:     Patient goals:  to get rid of the pain     Updated Goals to be achieved by 2024 :  1) Pt will demonstrate I with current HEP as prescribed in order to increase strength and decrease pain.   2) Pt will demonstrate the ability to safely complete at least 340 feet on the 2 minute walk test with no increase in pain in order to improve functional mobility.   3) Pt will demonstrate an Oswestry score of no more than 30% disability in order to improve quality of life.  4)  Pt will report worst pain in the last week no more than 4/10 in order to improve quality of life      Subjective:  4-5/10.  Pt reports she bowled Tuesday morning and Wednesday at noon. She reports increased pain since then. She reports paresthesias in R foot prior to starting. Pt also reports that her calf on the L LE just does not feel right. She reports she can feel it when she is lying in bed at night.     Any changes in Ambulatory Summary Sheet?  None    Objective:  Pt tolerated stretches well    Exercises: (No more than 4 columns)   Exercise/Equipment Date: 10/25/24 Date: 10/28/24 Date: 10/31/24 Date: 24            WARM

## 2024-11-07 ENCOUNTER — HOSPITAL ENCOUNTER (OUTPATIENT)
Dept: PHYSICAL THERAPY | Age: 69
Setting detail: THERAPIES SERIES
Discharge: HOME | End: 2024-11-07
Payer: MEDICARE

## 2024-11-07 PROCEDURE — 97110 THERAPEUTIC EXERCISES: CPT

## 2024-11-07 NOTE — FLOWSHEET NOTE
Outpatient Physical Therapy  [place]           [] Phone: [phone]   Fax: [phone]  [place]           [x] Phone: [phone]   Fax: [phone]        Physical Therapy Daily Treatment Note  Date:  2024    Patient Name:  Virgie Mejiarn  \"Chela\"  :  1955  MRN: [id number]  Restrictions/Precautions: No data recorded   Diagnosis:   Sciatica, right side [M54.31]    Date of Injury/Surgery: chronic >1 year  Treatment Diagnosis:   M62.81 muscle weakness, R26.89 abnormality of gait   Insurance/Certification information:   1) [employer] Medicare  2)    Referring Physician:  Mello Lanier MD     PCP: Mello Lanier MD  Plan of care signed (Y/N):  eval faxed  Outcome Measure: Oswestry: 50 = 28% disability   2MWT: 273.8 feet  Visit# / total visits:   4/10  Pain level: 4-5/10   Goals:     Patient goals:  to get rid of the pain     Updated Goals to be achieved by 2024 :  1) Pt will demonstrate I with current HEP as prescribed in order to increase strength and decrease pain.   2) Pt will demonstrate the ability to safely complete at least 340 feet on the 2 minute walk test with no increase in pain in order to improve functional mobility.   3) Pt will demonstrate an Oswestry score of no more than 30% disability in order to improve quality of life.  4)  Pt will report worst pain in the last week no more than 4/10 in order to improve quality of life      Subjective:  Pt reports she went bowling Tuesday and Wednesday, and it wasn't as bad the 2nd day as it was last week.  Pt reports she woke up this am with no tingling.     Any changes in Ambulatory Summary Sheet?  None    Objective:  Pt tolerated stretches well    Exercises: (No more than 4 columns)   Exercise/Equipment Date: 10/31/24 Date: 24 Date: 24           WARM UP      NuStep    X10' S6/A10 Lvl 4 X10' S7/A10 Lvl 4 X10' S7/A10 Lvl 5         TABLE      Glute set in hook lying      Bridge w/ ball squeeze 1x15 5\" 1x6

## 2024-11-13 ENCOUNTER — HOSPITAL ENCOUNTER (OUTPATIENT)
Dept: PHYSICAL THERAPY | Age: 69
Setting detail: THERAPIES SERIES
Discharge: HOME | End: 2024-11-13
Payer: MEDICARE

## 2024-11-13 PROCEDURE — 97530 THERAPEUTIC ACTIVITIES: CPT

## 2024-11-13 NOTE — FLOWSHEET NOTE
Outpatient Physical Therapy  [place]           [] Phone: [phone]   Fax: [phone]  [place]           [x] Phone: [phone]   Fax: [phone]        Physical Therapy Daily Treatment Note  Date:  2024    Patient Name:  Virgie Mejiarn  \"Chela\"  :  1955  MRN: [id number]  Restrictions/Precautions: No data recorded   Diagnosis:   Sciatica, right side [M54.31]    Date of Injury/Surgery: chronic >1 year  Treatment Diagnosis:   M62.81 muscle weakness, R26.89 abnormality of gait   Insurance/Certification information:   1) [employer] Medicare  2)    Referring Physician:  Mello Lanier MD     PCP: Mello Lanier MD  Plan of care signed (Y/N):  eval faxed  Outcome Measure: Oswestry:  = 28% disability   2MWT: 273.8 feet  Visit# / total visits:   4/10  Pain level: 4-5/10   Goals:     Patient goals:  to get rid of the pain     Updated Goals to be achieved by 2024 :  1) Pt will demonstrate I with current HEP as prescribed in order to increase strength and decrease pain.   2) Pt will demonstrate the ability to safely complete at least 340 feet on the 2 minute walk test with no increase in pain in order to improve functional mobility.   3) Pt will demonstrate an Oswestry score of no more than 30% disability in order to improve quality of life.  4)  Pt will report worst pain in the last week no more than 4/10 in order to improve quality of life      Subjective:  Pt reports she went bowling Tuesday and Wednesday, and it wasn't as bad the 2nd day as it was last week.  Pt reports she woke up this am with no tingling.     Any changes in Ambulatory Summary Sheet?  None    Objective:  Pt tolerated stretches well    Exercises: (No more than 4 columns)   Exercise/Equipment Date: 10/31/24 Date: 24 Date: 24 Date: 24            WARM UP       NuStep    X10' S6/A10 Lvl 4 X10' S7/A10 Lvl 4 X10' S7/A10 Lvl 5 X5' S7/A10 Lvl 5          TABLE       Glute set in hook lying

## 2024-11-15 ENCOUNTER — HOSPITAL ENCOUNTER (OUTPATIENT)
Dept: PHYSICAL THERAPY | Age: 69
Setting detail: THERAPIES SERIES
Discharge: HOME | End: 2024-11-15
Payer: MEDICARE

## 2024-11-15 PROCEDURE — 97113 AQUATIC THERAPY/EXERCISES: CPT

## 2024-11-15 NOTE — FLOWSHEET NOTE
Physical Therapy Aquatic Flow Sheet   Date:  11/15/2024    Patient Name:  Virgie Armstrong  \"Chela\"  :  1955  Restrictions/Precautions:  none  Diagnosis:   Sciatica, right side [M54.31]   Treatment Diagnosis:    M62.81 muscle weakness, R26.89 abnormality of gait   Insurance/Certification information:   1) [employer]  2)    Referring Physician:   Dr Mello Lanier MD  Any changes in Ambulatory Summary Sheet?: no  Plan of care signed (Y/N):  eval faxed  Visit# / total visits:  5/10  Pain level: Not rated/10   Electronically signed by:  Daphney Gallegos, PT, DPT    Subjective: Pt reports she felt better bowling this week, but she did not leave the house yesterday. She reports she wore slippers all day and she was doing housework. She reports she has increased paresthesias starting last night in her hip and then into her foot. She reports she has not had that as much before.    Key  B= Belt DB= Dumbells T= Theratube   H= Hydrotone N= Noodles W= Weights   P= Paddles S= Speedo equipment K= Kickboard     Exercises/Activities   Warm-up/Amb    Exercises      Slow forward/backward   X6'   HR/TR  1x15 B LE    Slow sideways  x6'  Marches  x2'    Slow backwards    Mini-squats      Medium forward    4-way SLR  2' 3 way all together    Medium sideways    Hip circles/fig 8  1x15 ea way B LE    Small shuffle    Hamstring curls  1x10 B LE    Jog    Knee extension      Braiding    Pelvic tilts      Bicycling    Scap squeezes          Shoulder flex/ext      Functional    Shoulder abd/add      Step    Shoulder H. abd/add      Lifting    Shoulder IR/ER      Hand to opp knee    Rowing      Push down squat    Bilateral pull down      UE PNF    Push/pull      LE PNF    Push downs      Wall push ups    Arm circles      SLS    Elbow flex/ext          Chin tuck      Stretching    UT shrugs/rolls      Gastroc/Soleus    Rocking horse      Hamstring  2x45\" R LE        SKTC    Other      Piriformis    Dangle/float - kick

## 2024-11-18 ENCOUNTER — HOSPITAL ENCOUNTER (OUTPATIENT)
Dept: PHYSICAL THERAPY | Age: 69
Setting detail: THERAPIES SERIES
Discharge: HOME | End: 2024-11-18
Payer: MEDICARE

## 2024-11-18 PROCEDURE — 97113 AQUATIC THERAPY/EXERCISES: CPT

## 2024-11-18 NOTE — FLOWSHEET NOTE
Physical Therapy Aquatic Flow Sheet   Date:  2024    Patient Name:  Virgie Armstrong  \"Chela\"  :  1955  Restrictions/Precautions:  none  Diagnosis:   Sciatica, right side [M54.31]   Treatment Diagnosis:    M62.81 muscle weakness, R26.89 abnormality of gait   Insurance/Certification information:   1) [employer]  2)    Referring Physician:   Dr Mello Lanier MD  Any changes in Ambulatory Summary Sheet?: no  Plan of care signed (Y/N):  eval faxed  Visit# / total visits:  5/10  Pain level: Not rated/10   Electronically signed by:  Daphney Gallegos, PT, DPT    Subjective: Pt reports she felt better bowling this week, but she did not leave the house yesterday. She reports she wore slippers all day and she was doing housework. She reports she has increased paresthesias starting last night in her hip and then into her foot. She reports she has not had that as much before.    Key  B= Belt DB= Dumbells T= Theratube   H= Hydrotone N= Noodles W= Weights   P= Paddles S= Speedo equipment K= Kickboard     Exercises/Activities   Warm-up/Amb    Exercises      Slow forward/backward   X6'   HR/TR  1x15 B LE    Slow sideways  x6'  Marches  x2'    Slow backwards    Mini-squats      Medium forward    4-way SLR  3' 3 way all together    Medium sideways    Hip circles/fig 8  X1' ea way B LE    Small shuffle    Hamstring curls  X1' ea B LE    Jog    Knee extension      Braiding    Pelvic tilts      Bicycling    Scap squeezes          Shoulder flex/ext      Functional    Shoulder abd/add      Step    Shoulder H. abd/add      Lifting    Shoulder IR/ER      Hand to opp knee    Rowing      Push down squat    Bilateral pull down      UE PNF    Push/pull      LE PNF    Push downs      Wall push ups    Arm circles      SLS    Elbow flex/ext          Chin tuck      Stretching    UT shrugs/rolls      Gastroc/Soleus    Rocking horse      Hamstring  2x1\" B LE        SKTC    Other      Piriformis    Dangle/float - kick

## 2024-11-26 ENCOUNTER — HOSPITAL ENCOUNTER (OUTPATIENT)
Dept: PHYSICAL THERAPY | Age: 69
Setting detail: THERAPIES SERIES
Discharge: HOME | End: 2024-11-26
Payer: MEDICARE

## 2024-11-26 PROCEDURE — 97113 AQUATIC THERAPY/EXERCISES: CPT

## 2024-11-26 NOTE — FLOWSHEET NOTE
Physical Therapy Aquatic Flow Sheet   Date:  2024    Patient Name:  Virgie Armstrong  \"Chela\"  :  1955  Restrictions/Precautions:  none  Diagnosis:   Sciatica, right side [M54.31]   Treatment Diagnosis:    M62.81 muscle weakness, R26.89 abnormality of gait   Insurance/Certification information:   1) [employer]  2)    Referring Physician:   Dr Mello Lanier MD  Any changes in Ambulatory Summary Sheet?: no  Plan of care signed (Y/N):  eval faxed  Visit# / total visits:  9/10  Pain level: 0/10   Electronically signed by:  Daphney Gallegos, PT, DPT    Subjective: Pt reports she is feeling pretty good and wants next session to be her last. She reports she will continue at the [facility] in [place].     Key  B= Belt DB= Dumbells T= Theratube   H= Hydrotone N= Noodles W= Weights   P= Paddles S= Speedo equipment K= Kickboard     Exercises/Activities   Warm-up/Amb    Exercises      Slow forward/backward   X6'   HR/TR  1x15 B LE    Slow sideways  x6'  Marches  x2'    Slow backwards    Mini-squats      Medium forward    4-way SLR  2' 3 way all together    Medium sideways    Hip circles/fig 8  X1' ea way B LE    Small shuffle    Hamstring curls  X1' ea B LE    Jog    Knee extension      Braiding    Pelvic tilts      Bicycling    Scap squeezes          Shoulder flex/ext      Functional    Shoulder abd/add      Step    Shoulder H. abd/add      Lifting    Shoulder IR/ER      Hand to opp knee    Rowing      Push down squat    Bilateral pull down      UE PNF    Push/pull      LE PNF    Push downs      Wall push ups    Arm circles      SLS    Elbow flex/ext          Chin tuck      Stretching    UT shrugs/rolls      Gastroc/Soleus    Rocking horse      Hamstring  2x1\" B LE        SKTC    Other      Piriformis    Dangle/float - kick  x10'    Hip flexor          Ladder pull          Pec stretch          Post deltoid           Treatment Included:  [] Therapeutic Exercise   [] Instruction in HEP  []

## 2025-01-21 NOTE — FLOWSHEET NOTE
Outpatient Physical Therapy  [place]           [] Phone: [phone]   Fax: [phone]  [place]           [x] Phone: [phone]   Fax: [phone]        Physical Therapy Daily Treatment Note  Date:  2024    Patient Name:  Virgie Armstrong  \"Chela\"  :  1955  MRN: [id number]  Restrictions/Precautions: No data recorded   Diagnosis:   Sciatica, right side [M54.31]    Date of Injury/Surgery: chronic >1 year  Treatment Diagnosis:   M62.81 muscle weakness, R26.89 abnormality of gait   Insurance/Certification information:   1) [employer]  2)    Referring Physician:  Mello Lanier MD     PCP: Mello Lanier MD  Plan of care signed (Y/N):  eval faxed  Outcome Measure: Oswestry: 1450 = 28% disability   2MWT: 273.8 feet  Visit# / total visits:   5/10  Pain level: 5/10   Goals:     Patient goals:  to get rid of the pain     Long term goals to be achieved by 2024:   Long term goal 1: Pt will demonstrate I with current HEP as prescribed in order to increase strength and decrease pain.   Long term goal 2: Pt will demonstrate R hip flexion strength at least 4+/5 in order to improve strength.   Long term goal 3: Pt will demonstrate R knee and ankle strength 5/5 in order to improve strength.   Long term goal 4: Pt will demonstrate an Oswestry score of no more than 18% disability in order to improve quality of life.   Long term goal 5: Pt will demonstrate the ability to safely ambulate 300 feet in the 2 minute walk test in order to improve functional mobility.   Long term goal 6: Pt will report worst pain in the last week no more than 4/10 in order to improve quality of life      Subjective:  Pain into her right foot as soon as she put her feet on the floor this morning. Walking helps her pain to a point. Thinks that she may try her inversion table to see if that helps with her pain    Any changes in Ambulatory Summary Sheet?  None    Objective:  Good form noted with all  done